# Patient Record
Sex: MALE | Race: WHITE | NOT HISPANIC OR LATINO | Employment: OTHER | URBAN - METROPOLITAN AREA
[De-identification: names, ages, dates, MRNs, and addresses within clinical notes are randomized per-mention and may not be internally consistent; named-entity substitution may affect disease eponyms.]

---

## 2017-11-27 ENCOUNTER — HOSPITAL ENCOUNTER (OUTPATIENT)
Dept: PREADMISSION TESTING | Facility: HOSPITAL | Age: 77
Discharge: HOME | End: 2017-11-27
Attending: INTERNAL MEDICINE | Admitting: INTERNAL MEDICINE

## 2017-11-27 LAB
ALBUMIN SERPL-MCNC: 3.9 G/DL (ref 3.5–4.8)
ALBUMIN/GLOB SERPL: 1.4 {RATIO} (ref 1–1.7)
ALP SERPL-CCNC: 43 IU/L (ref 32–91)
ALT SERPL-CCNC: 20 IU/L (ref 17–63)
ANION GAP SERPL CALC-SCNC: 9.2 MMOL/L (ref 10–20)
AST SERPL-CCNC: 18 IU/L (ref 15–41)
BASOPHILS # BLD AUTO: 0 10*3/UL (ref 0–0.2)
BASOPHILS NFR BLD AUTO: 1 % (ref 0–2)
BILIRUB SERPL-MCNC: 0.8 MG/DL (ref 0.3–1.2)
BUN SERPL-MCNC: 16 MG/DL (ref 8–20)
BUN/CREAT SERPL: 14.5 (ref 6.2–20.3)
CALCIUM SERPL-MCNC: 9.5 MG/DL (ref 8.9–10.3)
CHLORIDE SERPL-SCNC: 105 MMOL/L (ref 101–111)
CONV CO2: 26 MMOL/L (ref 22–32)
CONV TOTAL PROTEIN: 6.7 G/DL (ref 6.1–7.9)
CREAT UR-MCNC: 1.1 MG/DL (ref 0.7–1.2)
DIFFERENTIAL METHOD BLD: (no result)
EOSINOPHIL # BLD AUTO: 0.2 10*3/UL (ref 0–0.3)
EOSINOPHIL # BLD AUTO: 3 % (ref 0–3)
ERYTHROCYTE [DISTWIDTH] IN BLOOD BY AUTOMATED COUNT: 14.3 % (ref 11.5–14.5)
GLOBULIN UR ELPH-MCNC: 2.8 G/DL (ref 2.5–3.8)
GLUCOSE SERPL-MCNC: 102 MG/DL (ref 65–99)
HCT VFR BLD AUTO: 46.4 % (ref 40–54)
HGB BLD-MCNC: 15.4 G/DL (ref 14–18)
LYMPHOCYTES # BLD AUTO: 1.8 10*3/UL (ref 0.8–4.8)
LYMPHOCYTES NFR BLD AUTO: 26 % (ref 18–42)
MAGNESIUM SERPL-MCNC: 2.1 MG/DL (ref 1.8–2.5)
MCH RBC QN AUTO: 30.5 PG (ref 26–32)
MCHC RBC AUTO-ENTMCNC: 33.1 G/DL (ref 32–36)
MCV RBC AUTO: 91.9 FL (ref 80–94)
MONOCYTES # BLD AUTO: 0.6 10*3/UL (ref 0.1–1.3)
MONOCYTES NFR BLD AUTO: 9 % (ref 2–11)
NEUTROPHILS # BLD AUTO: 4.3 10*3/UL (ref 2.3–8.6)
NEUTROPHILS NFR BLD AUTO: 61 % (ref 50–75)
NRBC BLD AUTO-RTO: 0 /100{WBCS}
NRBC/RBC NFR BLD MANUAL: 0 10*3/UL
PLATELET # BLD AUTO: 129 10*3/UL (ref 150–450)
PMV BLD AUTO: 9.7 FL (ref 7.4–10.4)
POTASSIUM SERPL-SCNC: 4.2 MMOL/L (ref 3.6–5.1)
RBC # BLD AUTO: 5.05 10*6/UL (ref 4.6–6)
SODIUM SERPL-SCNC: 136 MMOL/L (ref 136–144)
TSH SERPL-ACNC: 2.39 UIU/ML (ref 0.34–5.6)
WBC # BLD AUTO: 7 10*3/UL (ref 4.5–11.5)

## 2019-06-28 ENCOUNTER — OFFICE VISIT (OUTPATIENT)
Dept: CARDIOLOGY | Facility: CLINIC | Age: 79
End: 2019-06-28

## 2019-06-28 VITALS
BODY MASS INDEX: 32.97 KG/M2 | WEIGHT: 236.4 LBS | RESPIRATION RATE: 18 BRPM | DIASTOLIC BLOOD PRESSURE: 78 MMHG | SYSTOLIC BLOOD PRESSURE: 154 MMHG | HEART RATE: 75 BPM | OXYGEN SATURATION: 96 %

## 2019-06-28 DIAGNOSIS — I10 ESSENTIAL HYPERTENSION: ICD-10-CM

## 2019-06-28 DIAGNOSIS — I48.0 PAROXYSMAL ATRIAL FIBRILLATION (HCC): Primary | ICD-10-CM

## 2019-06-28 DIAGNOSIS — E78.2 MIXED HYPERLIPIDEMIA: ICD-10-CM

## 2019-06-28 PROBLEM — E78.5 HYPERLIPIDEMIA: Status: ACTIVE | Noted: 2019-06-28

## 2019-06-28 PROCEDURE — 99213 OFFICE O/P EST LOW 20 MIN: CPT | Performed by: INTERNAL MEDICINE

## 2019-06-28 PROCEDURE — 93000 ELECTROCARDIOGRAM COMPLETE: CPT | Performed by: INTERNAL MEDICINE

## 2019-06-28 RX ORDER — LISINOPRIL 20 MG/1
20 TABLET ORAL DAILY
Status: ON HOLD | COMMUNITY
End: 2022-10-04

## 2019-06-28 RX ORDER — AMLODIPINE BESYLATE 2.5 MG/1
2.5 TABLET ORAL DAILY
COMMUNITY
End: 2020-11-23 | Stop reason: SDUPTHER

## 2019-06-28 RX ORDER — PRAVASTATIN SODIUM 20 MG
20 TABLET ORAL DAILY
Status: ON HOLD | COMMUNITY
End: 2022-10-04

## 2019-06-28 NOTE — PROGRESS NOTES
CC--Atrial fibrillation, hypertension    SUB--  78-YEAR-OLD MALE PATIENT WITH HISTORY OF ATRIAL FIBRILLATION AND 2 VESSEL CORONARY DISEASE STATUS POST BYPASS SURGERY  HAD RECURRENT ATRIAL ARRHYTHMIAS IN THE FORM OF ATRIAL FIBRILLATION BACK IN  AND STARTED BACK ON AMIODARONE AND UNDERWENT   CARDIOVERSION BACK IN OCTOBER AND HAD RECURRENT ATRIAL ARRHYTHMIAS IN THE FORM OF ATRIAL FLUTTER PRESENTED TO DR. STERLING OFFICE AND WAS SENT HERE FOR FURTHER EVALUATION.  PATIENT UNDERWENT RADIOFREQUENCY ABLATION OF ATRIAL FLUTTER AND  was DOING   REMARKABLY WELL,Patient developed recurrent symptomatic atrial fibrillation and underwent AF ablation  few months ago and comes in for follow-up -- he feels much better and remains in sinus rhythm and  back to functional class 1 without any chest pain or   dyspnea. chronic medical problems include paroxysmal atrial fibrillation atrial flutter, hypertension, coronary artery disease.          ASSESSMENT PLAN    SUSTAINED AN PERSISTENT AND SYMPTOMATIC ATRIAL FLUTTER-- no recurrence   recurrent symptomatic atrial fibrillation -- severe left and right atrial enlargement  noted-- post complex ablation to sinus rhythm with early recurrence of atrial fibrillation with  resolution currently in sinus   prior left atrial appendage ligation with bypass surgery  HYPERTENSION  CORONARY ARTERY DISEASE   positive for PFO    RECOMMENDATIONS      clinically doing well and medications were reviewed with the patient and follow-up in 6 months  Patient can stop Xarelto and take aspirin patient was educated        Vital Signs:    Pulse rate: 79 / minute  Pulse rhythm:   regular  Resp:       12 per minute  BP Sittin//82      Current Allergies (reviewed today):  No known allergies      Past Medical History:     Reviewed history from 2017 and no changes required:        Hyperlipidemia        Hypertension        Obesity        Arthritis        Peripheral vascular disease - < 50% stenoses of  the carotids        Atrial fibrillation-Radioablation 12/2016        Prostate biopsy - 2012 - benign        GERD        Coronary artery disease    Past Surgical History:     Reviewed history from 01/16/2017 and no changes required:        Nasal reconstruction        Prostate biopsy(2010)        Parathyroid adenma removed.         Cardioversion - 1/2012; 10/5/2016        CABG - 07/2014        A-flutter ablation 12/2016 [facility]    Family History Summary:      Reviewed history Last on 08/28/2018 and no changes required:12/14/2018  Father - Has Family History of Lung/Respiratory Disease - Entered On: 5/24/2016  Mother - Has Family History of Lung Cancer - Entered On: 2/2/2016      Social History:     Reviewed history from 08/28/2018 and no changes required:        Patient has never smoked.        Passive Smoke: N        Alcohol Use: Y        Drug Use: Y        HIV/High Risk: N            Review of Systems   General: No fatigue or tiredness, No change in weight   Eyes: No redness  Cardiovascular: No chest pain, no palpitations  Respiratory: No shortness of breath  Gastrointestinal: No nausea or vomiting, bleeding  Genitourinary: no hematuria or dysuria  Musculoskeletal: No arthralgia or myalgia  Skin: No rash  Neurologic: No numbness, tingling, syncope  Hematologic/Lymphatic: No abnormal bleeding      Physical Exam    General:      well developed, well nourished, in no acute distress.    Head:      normocephalic and atraumatic.    Eyes:      PERRL/EOM intact, conjunctiva and sclera clear with out nystagmus.    Neck:      no masses, thyromegaly, or abnormal cervical nodes.    Lungs:      clear bilaterally to auscultation.    Heart:      non-displaced PMI, chest non-tender; regular rate and rhythm, S1, S2 without murmurs, rubs, or gallops  Skin:      intact without lesions or rashes.    Psych:      alert and cooperative; normal mood and affect; normal attention span and concentration.        EKG shows sinus rhythm and  nonspecific ST-T wave changes

## 2019-09-10 ENCOUNTER — OFFICE VISIT (OUTPATIENT)
Dept: FAMILY MEDICINE CLINIC | Facility: CLINIC | Age: 79
End: 2019-09-10

## 2019-09-10 VITALS
RESPIRATION RATE: 14 BRPM | HEART RATE: 77 BPM | TEMPERATURE: 98.2 F | OXYGEN SATURATION: 96 % | WEIGHT: 230.6 LBS | BODY MASS INDEX: 32.28 KG/M2 | DIASTOLIC BLOOD PRESSURE: 83 MMHG | SYSTOLIC BLOOD PRESSURE: 148 MMHG | HEIGHT: 71 IN

## 2019-09-10 DIAGNOSIS — I25.10 CORONARY ARTERY DISEASE INVOLVING NATIVE CORONARY ARTERY OF NATIVE HEART WITHOUT ANGINA PECTORIS: ICD-10-CM

## 2019-09-10 DIAGNOSIS — J30.9 ALLERGIC RHINITIS, UNSPECIFIED SEASONALITY, UNSPECIFIED TRIGGER: ICD-10-CM

## 2019-09-10 DIAGNOSIS — I10 ESSENTIAL HYPERTENSION: Primary | ICD-10-CM

## 2019-09-10 DIAGNOSIS — E78.5 HYPERLIPIDEMIA, UNSPECIFIED HYPERLIPIDEMIA TYPE: ICD-10-CM

## 2019-09-10 DIAGNOSIS — M19.90 ARTHRITIS: ICD-10-CM

## 2019-09-10 DIAGNOSIS — I48.0 PAROXYSMAL ATRIAL FIBRILLATION (HCC): ICD-10-CM

## 2019-09-10 PROBLEM — Z13.9 ENCOUNTER FOR SCREENING: Status: RESOLVED | Noted: 2018-02-28 | Resolved: 2019-09-10

## 2019-09-10 PROBLEM — Z13.9 ENCOUNTER FOR SCREENING: Status: ACTIVE | Noted: 2018-02-28

## 2019-09-10 PROCEDURE — 99214 OFFICE O/P EST MOD 30 MIN: CPT | Performed by: FAMILY MEDICINE

## 2019-09-10 RX ORDER — MELOXICAM 7.5 MG/1
7.5 TABLET ORAL DAILY
Qty: 90 TABLET | Refills: 0 | Status: SHIPPED | OUTPATIENT
Start: 2019-09-10 | End: 2020-06-18

## 2019-09-10 RX ORDER — DOCUSATE SODIUM 100 MG/1
100 CAPSULE, LIQUID FILLED ORAL 2 TIMES DAILY PRN
Qty: 30 CAPSULE | Refills: 0 | Status: SHIPPED | OUTPATIENT
Start: 2019-09-10 | End: 2020-11-23

## 2019-09-10 RX ORDER — MELOXICAM 7.5 MG/1
1 TABLET ORAL DAILY
COMMUNITY
Start: 2016-08-02 | End: 2019-09-10 | Stop reason: SDUPTHER

## 2019-09-10 RX ORDER — OMEPRAZOLE 20 MG/1
1 CAPSULE, DELAYED RELEASE ORAL DAILY
COMMUNITY
Start: 2015-06-05 | End: 2019-11-18

## 2019-09-10 RX ORDER — ASPIRIN 81 MG/1
81 TABLET ORAL DAILY
Status: ON HOLD | COMMUNITY
End: 2022-10-04

## 2019-09-10 NOTE — PROGRESS NOTES
"Subjective   Tin Byrne is a 78 y.o. male.     Chief Complaint   Patient presents with   • Coronary Artery Disease     6 MTH F/U   • Hyperlipidemia   • Hypertension       HPI  Chief complaint: Hypertension hyperlipidemia fibrillation coronary disease GERD    Patient is a 78-year-old white male comes in for follow-up and maintenance of his current problems which include    1.  Hypertension-stable-patient on lisinopril 20 mg daily and Norvasc 2.5 mg daily.  He denies any lightheaded dizziness or chest pain.      2. hyperlipidemia-stable-patient on Pravachol 20 mg daily.  No myalgias arthralgias.      3.  Coronary artery disease-stable-patient on aspirin.  He denies chest pain shortness with orthopnea or PND.      4. Atrial fibrillation-stable-patient had 2 ablations.  Patient is in normal sinus rhythm.  She is on amiodarone 200 mg daily. The patient states he was told by his cardiologist he no longer needs to take take Xarelto.  On aspirin 81 mg daily.  He denies episodes of rapid or slow heart rhythm    5.  Arthritis-stable-patient on Mobic on as-needed basis but he states he takes this irregularly.    6.  Gastroesophageal reflux disease-stable-patient is on Prilosec 20 g daily.  No dysphagia or heartburn      The following portions of the patient's history were reviewed and updated as appropriate: allergies, current medications, past family history, past medical history, past social history, past surgical history and problem list.    Review of Systems    Objective     /83 (BP Location: Right arm, Patient Position: Sitting, Cuff Size: Adult)   Pulse 77   Temp 98.2 °F (36.8 °C) (Oral)   Resp 14   Ht 180.3 cm (71\")   Wt 105 kg (230 lb 9.6 oz)   SpO2 96%   BMI 32.16 kg/m²     Physical Exam   Constitutional: He is oriented to person, place, and time. He appears well-developed and well-nourished.   HENT:   Head: Normocephalic and atraumatic.   Eyes: Conjunctivae and EOM are normal. Pupils are equal, " round, and reactive to light.   Neck: Normal range of motion. Neck supple.   Cardiovascular: Normal rate, regular rhythm, normal heart sounds and intact distal pulses.   Pulmonary/Chest: Effort normal and breath sounds normal.   Abdominal: Soft. Bowel sounds are normal.   Musculoskeletal: Normal range of motion.   Neurological: He is alert and oriented to person, place, and time.   Skin: Skin is warm and dry.   Psychiatric: He has a normal mood and affect. His behavior is normal.   Nursing note and vitals reviewed.        Assessment/Plan   Tin was seen today for coronary artery disease, hyperlipidemia and hypertension.    Diagnoses and all orders for this visit:    Essential hypertension    Hyperlipidemia, unspecified hyperlipidemia type    Paroxysmal atrial fibrillation (CMS/HCC)    Coronary artery disease involving native coronary artery of native heart without angina pectoris    Allergic rhinitis, unspecified seasonality, unspecified trigger    Arthritis      Patient Instructions   Continue your current medications and treatment.    Follow up in the office in 6 months.    Laboratory testing at that time.      Jose Angel Ramirez Jr., MD    09/10/19

## 2019-10-29 ENCOUNTER — TELEPHONE (OUTPATIENT)
Dept: FAMILY MEDICINE CLINIC | Facility: CLINIC | Age: 79
End: 2019-10-29

## 2019-10-29 RX ORDER — AMOXICILLIN 875 MG/1
875 TABLET, COATED ORAL 2 TIMES DAILY
Qty: 20 TABLET | Refills: 0 | Status: SHIPPED | OUTPATIENT
Start: 2019-10-29 | End: 2019-11-18

## 2019-10-29 NOTE — TELEPHONE ENCOUNTER
Patient called stating he thought you were going to call in amoxicillin to [facility] in [facility].

## 2019-11-18 ENCOUNTER — OFFICE VISIT (OUTPATIENT)
Dept: CARDIOLOGY | Facility: CLINIC | Age: 79
End: 2019-11-18

## 2019-11-18 VITALS
SYSTOLIC BLOOD PRESSURE: 169 MMHG | BODY MASS INDEX: 32.5 KG/M2 | HEART RATE: 76 BPM | WEIGHT: 233 LBS | DIASTOLIC BLOOD PRESSURE: 79 MMHG

## 2019-11-18 DIAGNOSIS — I48.0 PAROXYSMAL ATRIAL FIBRILLATION (HCC): Primary | ICD-10-CM

## 2019-11-18 DIAGNOSIS — E78.2 MIXED HYPERLIPIDEMIA: ICD-10-CM

## 2019-11-18 DIAGNOSIS — I10 ESSENTIAL HYPERTENSION: ICD-10-CM

## 2019-11-18 PROCEDURE — 99213 OFFICE O/P EST LOW 20 MIN: CPT | Performed by: INTERNAL MEDICINE

## 2019-11-18 PROCEDURE — 93000 ELECTROCARDIOGRAM COMPLETE: CPT | Performed by: INTERNAL MEDICINE

## 2019-11-18 NOTE — PROGRESS NOTES
EKG shows sinus rhythm with first-degree AV block heart rate of 76 NH interval 200 QT of 391 QRS of 99 nonspecific ST changes and indication for EKG includes intermittent atrial arrhythmias and no significant EKG changes compared to prior ECG    CC--Atrial fibrillation, hypertension    SUB--79-year-old male patient with history of atrial fibrillation and two-vessel coronary artery disease status post bypass surgery with recurrent atrial arrhythmias in the form of atrial fibrillation and atrial flutter and underwent atrial flutter ablation and post atrial flutter ablation several months later developed atrial fibrillation underwent AF ablation and comes in for follow-up  He complains of brief palpitations with atrial arrhythmia lasting transiently  Is close to functional class I and denies any syncope or chest pain  chronic medical problems include paroxysmal atrial fibrillation atrial flutter, hypertension, coronary artery disease.          ASSESSMENT PLAN  Sustained permanent atrial flutter prior ablation without recurrence  Recurrent symptomatic atrial fibrillation with prior complex AF arrhythmia ablation with prior left atrial appendage ligation with bypass surgery with transient symptoms doing fairly well in functional class I  Hypertension--home monitoring educated  Presence of PFO  Coronary disease  Medications reviewed and follow-up after few months        Vital Signs: Blood pressure 169/79 pulse rate 76 patient afebrile respiration 12 times a minute    Current Allergies (reviewed today):  No known allergies      Past Medical History:     Reviewed history from 01/24/2017 and no changes required:        Hyperlipidemia        Hypertension        Obesity        Arthritis        Peripheral vascular disease - < 50% stenoses of the carotids        Atrial fibrillation-Radioablation 12/2016        Prostate biopsy - 2012 - benign        GERD        Coronary artery disease    Past Surgical History:     Reviewed  history from 01/16/2017 and no changes required:        Nasal reconstruction        Prostate biopsy(2010)        Parathyroid adenma removed.         Cardioversion - 1/2012; 10/5/2016        CABG - 07/2014        A-flutter ablation 12/2016 [facility]    Family History Summary:      Reviewed history Last on 08/28/2018 and no changes required:12/14/2018  Father - Has Family History of Lung/Respiratory Disease - Entered On: 5/24/2016  Mother - Has Family History of Lung Cancer - Entered On: 2/2/2016                  Review of Systems   General: No fatigue or tiredness, No change in weight   Eyes: No redness  Cardiovascular: No chest pain, no palpitations  Respiratory: No shortness of breath  Gastrointestinal: No nausea or vomiting, bleeding  Genitourinary: no hematuria or dysuria  Musculoskeletal: No arthralgia or myalgia  Skin: No rash  Neurologic: No numbness, tingling, syncope  Hematologic/Lymphatic: No abnormal bleeding      Physical Exam    General:      well developed, well nourished, in no acute distress.    Head:      normocephalic and atraumatic.    Eyes:      PERRL/EOM intact, conjunctiva and sclera clear with out nystagmus.    Neck:      no masses, thyromegaly, or abnormal cervical nodes.    Lungs:      clear bilaterally to auscultation.    Heart:      non-displaced PMI, chest non-tender; regular rate and rhythm, S1, S2 without murmurs, rubs, or gallops  Skin:      intact without lesions or rashes.    Psych:      alert and cooperative; normal mood and affect; normal attention span and concentration.

## 2020-02-21 ENCOUNTER — OFFICE VISIT (OUTPATIENT)
Dept: CARDIOLOGY | Facility: CLINIC | Age: 80
End: 2020-02-21

## 2020-02-21 VITALS
HEART RATE: 73 BPM | BODY MASS INDEX: 32.36 KG/M2 | SYSTOLIC BLOOD PRESSURE: 161 MMHG | OXYGEN SATURATION: 99 % | WEIGHT: 232 LBS | DIASTOLIC BLOOD PRESSURE: 93 MMHG

## 2020-02-21 DIAGNOSIS — I10 ESSENTIAL HYPERTENSION: ICD-10-CM

## 2020-02-21 DIAGNOSIS — I48.0 PAROXYSMAL ATRIAL FIBRILLATION (HCC): Primary | ICD-10-CM

## 2020-02-21 DIAGNOSIS — E78.2 MIXED HYPERLIPIDEMIA: ICD-10-CM

## 2020-02-21 PROCEDURE — 93000 ELECTROCARDIOGRAM COMPLETE: CPT | Performed by: INTERNAL MEDICINE

## 2020-02-21 PROCEDURE — 99213 OFFICE O/P EST LOW 20 MIN: CPT | Performed by: INTERNAL MEDICINE

## 2020-02-21 NOTE — PROGRESS NOTES
CC--Atrial fibrillation, hypertension    SUB--79-year-old male patient with history of atrial fibrillation and two-vessel coronary artery disease status post bypass surgery with recurrent atrial arrhythmias in the form of atrial fibrillation and atrial flutter and underwent atrial flutter ablation and post atrial flutter ablation several months later developed atrial fibrillation underwent AF ablation and comes in for follow-up  He is doing remarkably well without symptoms of palpitations or atrial arrhythmias in the last few months and remains in functional class and denies any syncope or chest pain  chronic medical problems include paroxysmal atrial fibrillation atrial flutter, hypertension, coronary artery disease.          ASSESSMENT PLAN  Sustained permanent atrial flutter prior ablation without recurrence  Recurrent symptomatic atrial fibrillation with prior complex AF arrhythmia ablation with prior left atrial appendage ligation with bypass surgery  doing fairly well in functional class I  Hypertension--well controlled at home monitoring   Presence of PFO  Coronary disease  Medications reviewed and follow-up after few months        Vital Signs: Blood pressure 161/93 pulse rate 73 patient afebrile respiration 12 times a minute  EKG shows sinus rhythm with left atrial enlargement and low voltage complexes heart rate of 73 NH interval 162 QRS of 101 QTC of 427 and no significant changes compared to prior EKG and EKG indication includes atrial arrhythmias with prior ablation    Current Allergies (reviewed today):  No known allergies      Past Medical History:     Reviewed history from 01/24/2017 and no changes required:        Hyperlipidemia        Hypertension        Obesity        Arthritis        Peripheral vascular disease - < 50% stenoses of the carotids        Atrial fibrillation-Radioablation 12/2016        Prostate biopsy - 2012 - benign        GERD        Coronary artery disease    Past Surgical  History:     Reviewed history from 01/16/2017 and no changes required:        Nasal reconstruction        Prostate biopsy(2010)        Parathyroid adenma removed.         Cardioversion - 1/2012; 10/5/2016        CABG - 07/2014        A-flutter ablation 12/2016 [facility]            Review of Systems   General: No fatigue or tiredness, No change in weight   Eyes: No redness  Cardiovascular: No chest pain, no palpitations  Respiratory: No shortness of breath  Gastrointestinal: No nausea or vomiting, bleeding  Genitourinary: no hematuria or dysuria  Musculoskeletal: No arthralgia or myalgia  Skin: No rash  Neurologic: No numbness, tingling, syncope  Hematologic/Lymphatic: No abnormal bleeding      Physical Exam    General:      well developed, well nourished, in no acute distress.    Head:      normocephalic and atraumatic.    Eyes:      PERRL/EOM intact, conjunctiva and sclera clear with out nystagmus.    Neck:      no masses, thyromegaly, or abnormal cervical nodes.    Lungs:      clear bilaterally to auscultation.    Heart:      non-displaced PMI, chest non-tender; regular rate and rhythm, S1, S2 without murmurs, rubs, or gallops  Skin:      intact without lesions or rashes.    Psych:      alert and cooperative; normal mood and affect; normal attention span and concentration.      Electronically signed by Mark Nolan MD, 02/21/20, 9:03 PM.

## 2020-06-18 ENCOUNTER — OFFICE VISIT (OUTPATIENT)
Dept: FAMILY MEDICINE CLINIC | Facility: CLINIC | Age: 80
End: 2020-06-18

## 2020-06-18 VITALS
RESPIRATION RATE: 20 BRPM | SYSTOLIC BLOOD PRESSURE: 155 MMHG | HEIGHT: 71 IN | WEIGHT: 233.4 LBS | HEART RATE: 55 BPM | BODY MASS INDEX: 32.68 KG/M2 | OXYGEN SATURATION: 99 % | DIASTOLIC BLOOD PRESSURE: 85 MMHG

## 2020-06-18 DIAGNOSIS — I48.0 PAROXYSMAL ATRIAL FIBRILLATION (HCC): ICD-10-CM

## 2020-06-18 DIAGNOSIS — E78.5 HYPERLIPIDEMIA, UNSPECIFIED HYPERLIPIDEMIA TYPE: ICD-10-CM

## 2020-06-18 DIAGNOSIS — Z00.00 ENCOUNTER FOR MEDICARE ANNUAL WELLNESS EXAM: ICD-10-CM

## 2020-06-18 DIAGNOSIS — I10 ESSENTIAL HYPERTENSION: Primary | ICD-10-CM

## 2020-06-18 DIAGNOSIS — I25.10 CORONARY ARTERY DISEASE INVOLVING NATIVE CORONARY ARTERY OF NATIVE HEART WITHOUT ANGINA PECTORIS: ICD-10-CM

## 2020-06-18 DIAGNOSIS — M19.90 ARTHRITIS: ICD-10-CM

## 2020-06-18 PROCEDURE — 99214 OFFICE O/P EST MOD 30 MIN: CPT | Performed by: FAMILY MEDICINE

## 2020-06-18 PROCEDURE — G0438 PPPS, INITIAL VISIT: HCPCS | Performed by: FAMILY MEDICINE

## 2020-06-18 RX ORDER — MELOXICAM 15 MG/1
15 TABLET ORAL DAILY
Qty: 90 TABLET | Refills: 3 | Status: SHIPPED | OUTPATIENT
Start: 2020-06-18 | End: 2022-01-19 | Stop reason: SDUPTHER

## 2020-06-18 NOTE — PROGRESS NOTES
"Subjective   Tin Byrne is a 79 y.o. male.     Chief Complaint   Patient presents with   • Medicare Wellness-Initial Visit   • Hypertension   • Atrial Fibrillation   • Coronary Artery Disease       HPI  Chief complaint: Hypertension atrial fibrillation hyperlipidemia coronary artery disease    The patient is an 79-year-old white male who comes in for follow-up and maintenance of his current problems which include    1.  Hypertension-stable-patient on amlodipine 2.5 mg daily lisinopril 20 mg daily.  He denied headache lightheadedness dizziness or chest pain.    2.  Atrial fibrillation-stable-patient is on no rate limiting drugs.  He denied episodes of rapid or slow heart rhythm.  He is had the left atrial appendage closed surgically.  He is on aspirin 81 mg daily.    3.  Hyperlipidemia-stable-patient on Pravachol 20 mg daily.  Denies myalgias and arthralgias.  No nausea or anorexia.    4.  Coronary artery disease-stable-patient on aspirin 81 mg daily.  He denies chest pain shortness of breath orthopnea or PND.    5.  Osteoarthritis-stable-patient is currently on meloxicam.        The following portions of the patient's history were reviewed and updated as appropriate: allergies, current medications, past family history, past medical history, past social history, past surgical history and problem list.    Review of Systems    Objective     /85 (BP Location: Right arm, Patient Position: Sitting, Cuff Size: Adult)   Pulse 55   Resp 20   Ht 180.3 cm (71\")   Wt 106 kg (233 lb 6.4 oz)   SpO2 99%   BMI 32.55 kg/m²     Physical Exam   Constitutional: He is oriented to person, place, and time. He appears well-developed and well-nourished.   HENT:   Head: Normocephalic and atraumatic.   Eyes: Pupils are equal, round, and reactive to light. Conjunctivae and EOM are normal.   Neck: Normal range of motion. Neck supple.   Cardiovascular: Normal rate, normal heart sounds and intact distal pulses. An " irregularly irregular rhythm present.   Pulmonary/Chest: Effort normal and breath sounds normal.   Abdominal: Soft. Bowel sounds are normal.   Musculoskeletal: Normal range of motion.   Neurological: He is alert and oriented to person, place, and time.   Skin: Skin is warm and dry.   Psychiatric: He has a normal mood and affect. His behavior is normal.   Nursing note and vitals reviewed.        Assessment/Plan   Tin was seen today for medicare wellness-initial visit, hypertension, atrial fibrillation and coronary artery disease.    Diagnoses and all orders for this visit:    Essential hypertension    Encounter for Medicare annual wellness exam    Hyperlipidemia, unspecified hyperlipidemia type    Paroxysmal atrial fibrillation (CMS/HCC)    Coronary artery disease involving native coronary artery of native heart without angina pectoris    Arthritis    Other orders  -     meloxicam (MOBIC) 15 MG tablet; Take 1 tablet by mouth Daily.      Patient Instructions   Continue your current medications and treatment.    Follow up in the office in 6 months.    Laboratory testing at that time.      Jose Angel Ramirez Jr., MD    06/18/20

## 2020-09-21 ENCOUNTER — OFFICE VISIT (OUTPATIENT)
Dept: FAMILY MEDICINE CLINIC | Facility: CLINIC | Age: 80
End: 2020-09-21

## 2020-09-21 ENCOUNTER — LAB (OUTPATIENT)
Dept: LAB | Facility: HOSPITAL | Age: 80
End: 2020-09-21

## 2020-09-21 VITALS
HEIGHT: 70 IN | DIASTOLIC BLOOD PRESSURE: 76 MMHG | RESPIRATION RATE: 20 BRPM | SYSTOLIC BLOOD PRESSURE: 129 MMHG | OXYGEN SATURATION: 99 % | BODY MASS INDEX: 31.61 KG/M2 | WEIGHT: 220.8 LBS | TEMPERATURE: 97.1 F | HEART RATE: 95 BPM

## 2020-09-21 DIAGNOSIS — R63.0 ANOREXIA: Primary | ICD-10-CM

## 2020-09-21 DIAGNOSIS — R63.4 WEIGHT LOSS: ICD-10-CM

## 2020-09-21 DIAGNOSIS — R63.0 ANOREXIA: ICD-10-CM

## 2020-09-21 LAB
ALBUMIN SERPL-MCNC: 3.6 G/DL (ref 3.5–5.2)
ALBUMIN/GLOB SERPL: 1.2 G/DL
ALP SERPL-CCNC: 58 U/L (ref 39–117)
ALT SERPL W P-5'-P-CCNC: 26 U/L (ref 1–41)
AMYLASE SERPL-CCNC: 52 U/L (ref 28–100)
ANION GAP SERPL CALCULATED.3IONS-SCNC: 11.3 MMOL/L (ref 5–15)
AST SERPL-CCNC: 20 U/L (ref 1–40)
BASOPHILS # BLD AUTO: 0.03 10*3/MM3 (ref 0–0.2)
BASOPHILS NFR BLD AUTO: 0.4 % (ref 0–1.5)
BILIRUB SERPL-MCNC: 0.6 MG/DL (ref 0–1.2)
BILIRUB UR QL STRIP: NEGATIVE
BUN SERPL-MCNC: 11 MG/DL (ref 8–23)
BUN/CREAT SERPL: 10.9 (ref 7–25)
CALCIUM SPEC-SCNC: 9.3 MG/DL (ref 8.6–10.5)
CHLORIDE SERPL-SCNC: 102 MMOL/L (ref 98–107)
CLARITY UR: CLEAR
CO2 SERPL-SCNC: 24.7 MMOL/L (ref 22–29)
COLOR UR: ABNORMAL
CREAT SERPL-MCNC: 1.01 MG/DL (ref 0.76–1.27)
DEPRECATED RDW RBC AUTO: 42.8 FL (ref 37–54)
EOSINOPHIL # BLD AUTO: 0.1 10*3/MM3 (ref 0–0.4)
EOSINOPHIL NFR BLD AUTO: 1.3 % (ref 0.3–6.2)
ERYTHROCYTE [DISTWIDTH] IN BLOOD BY AUTOMATED COUNT: 13 % (ref 12.3–15.4)
GFR SERPL CREATININE-BSD FRML MDRD: 71 ML/MIN/1.73
GLOBULIN UR ELPH-MCNC: 3.1 GM/DL
GLUCOSE SERPL-MCNC: 108 MG/DL (ref 65–99)
GLUCOSE UR STRIP-MCNC: NEGATIVE MG/DL
HCT VFR BLD AUTO: 46.2 % (ref 37.5–51)
HGB BLD-MCNC: 15.4 G/DL (ref 13–17.7)
HGB UR QL STRIP.AUTO: NEGATIVE
IMM GRANULOCYTES # BLD AUTO: 0.03 10*3/MM3 (ref 0–0.05)
IMM GRANULOCYTES NFR BLD AUTO: 0.4 % (ref 0–0.5)
KETONES UR QL STRIP: ABNORMAL
LEUKOCYTE ESTERASE UR QL STRIP.AUTO: NEGATIVE
LIPASE SERPL-CCNC: 24 U/L (ref 13–60)
LYMPHOCYTES # BLD AUTO: 1.09 10*3/MM3 (ref 0.7–3.1)
LYMPHOCYTES NFR BLD AUTO: 14.5 % (ref 19.6–45.3)
MCH RBC QN AUTO: 29.8 PG (ref 26.6–33)
MCHC RBC AUTO-ENTMCNC: 33.3 G/DL (ref 31.5–35.7)
MCV RBC AUTO: 89.5 FL (ref 79–97)
MONOCYTES # BLD AUTO: 0.78 10*3/MM3 (ref 0.1–0.9)
MONOCYTES NFR BLD AUTO: 10.3 % (ref 5–12)
NEUTROPHILS NFR BLD AUTO: 5.51 10*3/MM3 (ref 1.7–7)
NEUTROPHILS NFR BLD AUTO: 73.1 % (ref 42.7–76)
NITRITE UR QL STRIP: NEGATIVE
NRBC BLD AUTO-RTO: 0 /100 WBC (ref 0–0.2)
PH UR STRIP.AUTO: 5.5 [PH] (ref 5–8)
PLATELET # BLD AUTO: 169 10*3/MM3 (ref 140–450)
PMV BLD AUTO: 12.4 FL (ref 6–12)
POTASSIUM SERPL-SCNC: 4.1 MMOL/L (ref 3.5–5.2)
PROT SERPL-MCNC: 6.7 G/DL (ref 6–8.5)
PROT UR QL STRIP: NEGATIVE
RBC # BLD AUTO: 5.16 10*6/MM3 (ref 4.14–5.8)
SODIUM SERPL-SCNC: 138 MMOL/L (ref 136–145)
SP GR UR STRIP: 1.02 (ref 1–1.03)
UROBILINOGEN UR QL STRIP: ABNORMAL
WBC # BLD AUTO: 7.54 10*3/MM3 (ref 3.4–10.8)

## 2020-09-21 PROCEDURE — 99213 OFFICE O/P EST LOW 20 MIN: CPT | Performed by: FAMILY MEDICINE

## 2020-09-21 PROCEDURE — 82150 ASSAY OF AMYLASE: CPT

## 2020-09-21 PROCEDURE — 85025 COMPLETE CBC W/AUTO DIFF WBC: CPT

## 2020-09-21 PROCEDURE — 83690 ASSAY OF LIPASE: CPT

## 2020-09-21 PROCEDURE — 36415 COLL VENOUS BLD VENIPUNCTURE: CPT

## 2020-09-21 PROCEDURE — 81003 URINALYSIS AUTO W/O SCOPE: CPT

## 2020-09-21 PROCEDURE — 80053 COMPREHEN METABOLIC PANEL: CPT

## 2020-09-21 NOTE — PROGRESS NOTES
"Subjective   Tin Byrne is a 79 y.o. male.     Chief Complaint   Patient presents with   • Fatigue   • Anorexia       HPI  Chief complaint: Fatigue and anorexia    The patient is an 79-year-old white male comes in for follow-up and maintenance of his current problems which include    1.  Fatigue/anorexia-new-patient states that for the past 2 to 4 weeks he has had increased tiredness and fatigue.  Patient also complains of lack of appetite.  He states that he feels hungry and when he starts to eat he is not able to eat as much as he normally does and his appetite rapidly goes away.  Does have some nausea.  Denied vomiting.  Denied diarrhea.  Denied blood in stool.  Denied abdominal pain or fever chills or night sweats.  He does complain of some dry cough.        The following portions of the patient's history were reviewed and updated as appropriate: allergies, current medications, past family history, past medical history, past social history, past surgical history and problem list.    Review of Systems    Objective     /76 (BP Location: Left arm, Patient Position: Sitting, Cuff Size: Large Adult)   Pulse 95   Temp 97.1 °F (36.2 °C) (Infrared)   Resp 20   Ht 177.8 cm (70\")   Wt 100 kg (220 lb 12.8 oz)   SpO2 99%   BMI 31.68 kg/m²     Physical Exam  Vitals signs and nursing note reviewed.   Constitutional:       Appearance: Normal appearance. He is well-developed and normal weight.   HENT:      Head: Normocephalic and atraumatic.      Nose: Nose normal.      Mouth/Throat:      Mouth: Mucous membranes are dry.      Pharynx: Oropharynx is clear.   Eyes:      Extraocular Movements: Extraocular movements intact.      Conjunctiva/sclera: Conjunctivae normal.      Pupils: Pupils are equal, round, and reactive to light.   Neck:      Musculoskeletal: Normal range of motion and neck supple.   Cardiovascular:      Rate and Rhythm: Normal rate. Rhythm regularly irregular.      Heart sounds: Normal heart " sounds.   Pulmonary:      Effort: Pulmonary effort is normal.      Breath sounds: Normal breath sounds.   Abdominal:      General: Abdomen is flat. Bowel sounds are normal.      Palpations: Abdomen is soft.   Musculoskeletal: Normal range of motion.   Skin:     General: Skin is warm and dry.   Neurological:      General: No focal deficit present.      Mental Status: He is alert and oriented to person, place, and time. Mental status is at baseline.   Psychiatric:         Behavior: Behavior normal.         Thought Content: Thought content normal.         Judgment: Judgment normal.     The patient was advised that the cause for his symptoms was not immediately obvious.  I recommended laboratory testing and CT scan of the abdomen pelvis and chest.    Assessment/Plan   Tin was seen today for fatigue and anorexia.    Diagnoses and all orders for this visit:    Anorexia  -     CT Abdomen Pelvis With Contrast; Future  -     CT Chest With Contrast; Future  -     CBC & Differential; Future  -     Comprehensive Metabolic Panel; Future  -     Urinalysis With / Culture If Indicated -; Future  -     Amylase; Future  -     Lipase; Future    Weight loss  -     CT Abdomen Pelvis With Contrast; Future  -     CT Chest With Contrast; Future  -     CBC & Differential; Future  -     Comprehensive Metabolic Panel; Future  -     Urinalysis With / Culture If Indicated -; Future  -     Amylase; Future  -     Lipase; Future      Patient Instructions   Continue your current medications and treatment.    Have the labs and the CT scans done and call for results.    Further care will depend on the results of the tests and how you progress.      Jose Angel Ramirez Jr., MD    09/21/20

## 2020-09-21 NOTE — PATIENT INSTRUCTIONS
Continue your current medications and treatment.    Have the labs and the CT scans done and call for results.    Further care will depend on the results of the tests and how you progress.

## 2020-09-25 ENCOUNTER — APPOINTMENT (OUTPATIENT)
Dept: CT IMAGING | Facility: HOSPITAL | Age: 80
End: 2020-09-25

## 2020-09-28 ENCOUNTER — TELEPHONE (OUTPATIENT)
Dept: FAMILY MEDICINE CLINIC | Facility: CLINIC | Age: 80
End: 2020-09-28

## 2020-09-28 NOTE — TELEPHONE ENCOUNTER
I spoke with the patient.   He tested positive for COVID which can explain his symptoms of anorexia and weight loss.  I recommended symptomatic therapy.  He is to quarantine.

## 2020-10-12 ENCOUNTER — TELEPHONE (OUTPATIENT)
Dept: FAMILY MEDICINE CLINIC | Facility: CLINIC | Age: 80
End: 2020-10-12

## 2020-11-23 ENCOUNTER — OFFICE VISIT (OUTPATIENT)
Dept: CARDIOLOGY | Facility: CLINIC | Age: 80
End: 2020-11-23

## 2020-11-23 VITALS
WEIGHT: 228 LBS | OXYGEN SATURATION: 98 % | BODY MASS INDEX: 32.71 KG/M2 | SYSTOLIC BLOOD PRESSURE: 180 MMHG | DIASTOLIC BLOOD PRESSURE: 94 MMHG | HEART RATE: 79 BPM

## 2020-11-23 DIAGNOSIS — E78.2 MIXED HYPERLIPIDEMIA: ICD-10-CM

## 2020-11-23 DIAGNOSIS — I25.10 CORONARY ARTERY DISEASE INVOLVING NATIVE CORONARY ARTERY OF NATIVE HEART WITHOUT ANGINA PECTORIS: ICD-10-CM

## 2020-11-23 DIAGNOSIS — I10 ESSENTIAL HYPERTENSION: ICD-10-CM

## 2020-11-23 DIAGNOSIS — I48.0 PAROXYSMAL ATRIAL FIBRILLATION (HCC): Primary | ICD-10-CM

## 2020-11-23 PROCEDURE — 93000 ELECTROCARDIOGRAM COMPLETE: CPT | Performed by: INTERNAL MEDICINE

## 2020-11-23 PROCEDURE — 99214 OFFICE O/P EST MOD 30 MIN: CPT | Performed by: INTERNAL MEDICINE

## 2020-11-23 RX ORDER — AMLODIPINE BESYLATE 5 MG/1
5 TABLET ORAL DAILY
Qty: 90 TABLET | Refills: 3 | Status: ON HOLD | OUTPATIENT
Start: 2020-11-23 | End: 2022-10-04

## 2020-11-23 NOTE — PROGRESS NOTES
CC--Atrial fibrillation, hypertension    SUB--80-year-old male patient with history of atrial fibrillation and two-vessel coronary artery disease status post bypass surgery with recurrent atrial arrhythmias in the form of atrial fibrillation and atrial flutter and underwent atrial flutter ablation and post atrial flutter ablation several months later developed atrial fibrillation underwent AF ablation and comes in for follow-up.He is doing remarkably well without symptoms of palpitations or atrial arrhythmias in the last few months and remains in functional class and denies any syncope or chest pain.  chronic medical problems include paroxysmal atrial fibrillation atrial flutter, hypertension, coronary artery disease.  She had Covid infection back in August and during that time he had atrial fibrillation and since then he has recovered very well and back to exercise regimen without recurrent symptoms and he has noticed periodically that his blood pressure has been elevated          ASSESSMENT PLAN  Sustained permanent atrial flutter prior ablation without recurrence  Recurrent symptomatic atrial fibrillation with prior complex AF arrhythmia ablation with prior left atrial appendage ligation with bypass surgery  doing fairly well in functional class I  Hypertension--increase amlodipine to 5 mg a day and continue home monitoring  Presence of PFO  Coronary disease  Medications reviewed and follow-up after few months  Prescription for amlodipine sent out      Vital Signs: Blood pressure 180/94 pulse rate 79 patient afebrile respiration 12 times a minute    Past Medical History:     Reviewed history from 01/24/2017 and no changes required:        Hyperlipidemia        Hypertension        Obesity        Arthritis        Peripheral vascular disease - < 50% stenoses of the carotids        Atrial fibrillation-Radioablation 12/2016        Prostate biopsy - 2012 - benign        GERD        Coronary artery disease    Past  Surgical History:     Reviewed history from 01/16/2017 and no changes required:        Nasal reconstruction        Prostate biopsy(2010)        Parathyroid adenma removed.         Cardioversion - 1/2012; 10/5/2016        CABG - 07/2014        A-flutter ablation 12/2016 [facility]            Review of Systems   General: No fatigue or tiredness, No change in weight   Eyes: No redness  Cardiovascular: No chest pain, no palpitations  Respiratory: No shortness of breath  Gastrointestinal: No nausea or vomiting, bleeding  Genitourinary: no hematuria or dysuria  Musculoskeletal: No arthralgia or myalgia  Skin: No rash  Neurologic: No numbness, tingling, syncope  Hematologic/Lymphatic: No abnormal bleeding      Physical Exam    General:      well developed, well nourished, in no acute distress.    Head:      normocephalic and atraumatic.    Eyes:      PERRL/EOM intact, conjunctiva and sclera clear with out nystagmus.    Neck:      no masses, thyromegaly, or abnormal cervical nodes.    Lungs:      clear bilaterally to auscultation.    Heart:      non-displaced PMI, chest non-tender; regular rate and rhythm, S1, S2 without murmurs, rubs, or gallops  Skin:      intact without lesions or rashes.    Psych:      alert and cooperative; normal mood and affect; normal attention span and concentration.          ECG 12 Lead    Date/Time: 11/23/2020 4:13 PM  Performed by: Mark Nolan MD  Authorized by: Mark Nolan MD   Comparison: compared with previous ECG   Similar to previous ECG  Rhythm: sinus rhythm  Rate: normal  Conduction: 1st degree AV block  Q waves: III    Other findings: non-specific ST-T wave changes          Electronically signed by Mark Nolan MD, 11/23/20, 4:07 PM EST.

## 2020-11-24 ENCOUNTER — TELEPHONE (OUTPATIENT)
Dept: CARDIOLOGY | Facility: CLINIC | Age: 80
End: 2020-11-24

## 2020-11-24 NOTE — TELEPHONE ENCOUNTER
Pt called stating refill on amlodipine did not go through to the pharmacy.  I called pharmacy and authorized refill let them know it was sent on 11/23/20 by Dr Nolan.  Pt will  refill later today.

## 2021-01-07 ENCOUNTER — OFFICE VISIT (OUTPATIENT)
Dept: FAMILY MEDICINE CLINIC | Facility: CLINIC | Age: 81
End: 2021-01-07

## 2021-01-07 VITALS
RESPIRATION RATE: 16 BRPM | WEIGHT: 233 LBS | DIASTOLIC BLOOD PRESSURE: 90 MMHG | SYSTOLIC BLOOD PRESSURE: 159 MMHG | BODY MASS INDEX: 33.36 KG/M2 | OXYGEN SATURATION: 100 % | HEART RATE: 74 BPM | TEMPERATURE: 96.6 F | HEIGHT: 70 IN

## 2021-01-07 DIAGNOSIS — E78.5 HYPERLIPIDEMIA, UNSPECIFIED HYPERLIPIDEMIA TYPE: Chronic | ICD-10-CM

## 2021-01-07 DIAGNOSIS — I48.0 PAROXYSMAL ATRIAL FIBRILLATION (HCC): Chronic | ICD-10-CM

## 2021-01-07 DIAGNOSIS — I10 ESSENTIAL HYPERTENSION: Primary | Chronic | ICD-10-CM

## 2021-01-07 DIAGNOSIS — M19.90 ARTHRITIS: Chronic | ICD-10-CM

## 2021-01-07 DIAGNOSIS — K21.9 GASTROESOPHAGEAL REFLUX DISEASE WITHOUT ESOPHAGITIS: Chronic | ICD-10-CM

## 2021-01-07 DIAGNOSIS — I25.10 CORONARY ARTERY DISEASE INVOLVING NATIVE CORONARY ARTERY OF NATIVE HEART WITHOUT ANGINA PECTORIS: Chronic | ICD-10-CM

## 2021-01-07 PROBLEM — R63.0 ANOREXIA: Status: RESOLVED | Noted: 2020-09-21 | Resolved: 2021-01-07

## 2021-01-07 PROCEDURE — 99214 OFFICE O/P EST MOD 30 MIN: CPT | Performed by: FAMILY MEDICINE

## 2021-01-07 NOTE — PROGRESS NOTES
"Subjective   Tin Byrne is a 80 y.o. male.     Chief Complaint   Patient presents with   • Hypertension     6 month followup   • Hyperlipidemia       HPI  Chief complaint: Hypertension hyperlipidemia atrial fibrillation coronary artery disease    Patient is an 80-year-old white male comes in for follow-up and maintenance of his current problems which include    1.  Coronary artery disease-stable-patient on aspirin 81 mg daily.  Denies chest pain shortness breath orthopnea or PND.    2.  Hypertension-stable-patient on amlodipine 5 mg daily lisinopril 20 mg daily.  He denied headache lightheadedness dizziness or chest pain.    3.  Hyperlipidemia-stable-patient on Pravachol 20 mg daily.  No myalgias no arthralgias.  No nausea or anorexia.    4.  Atrial fibrillation-stable-patient is on aspirin 81 mg daily.  Patient is not on any rate limiting drugs.  Patient's had radioablation.  He states that he is in sinus rhythm.    5.  Arthritis-stable-patient on Mobic 15-minute.  States his help with his joint pain.    6.  Gastroesophageal reflux disease-stable-patient on proton pump inhibitor over-the-counter.      The following portions of the patient's history were reviewed and updated as appropriate: allergies, current medications, past family history, past medical history, past social history, past surgical history and problem list.    Review of Systems    Objective     /90 (BP Location: Right arm, Patient Position: Sitting, Cuff Size: Large Adult)   Pulse 74   Temp 96.6 °F (35.9 °C) (Infrared)   Resp 16   Ht 177.8 cm (70\")   Wt 106 kg (233 lb)   SpO2 100%   BMI 33.43 kg/m²     Physical Exam  Vitals signs and nursing note reviewed.   Constitutional:       Appearance: He is well-developed and normal weight.   HENT:      Head: Normocephalic and atraumatic.   Cardiovascular:      Rate and Rhythm: Normal rate and regular rhythm.      Pulses: Normal pulses.      Heart sounds: Normal heart sounds. "   Pulmonary:      Effort: Pulmonary effort is normal.      Breath sounds: Normal breath sounds.   Abdominal:      General: Bowel sounds are normal.      Palpations: Abdomen is soft.   Musculoskeletal: Normal range of motion.   Skin:     General: Skin is warm and dry.   Neurological:      Mental Status: He is alert and oriented to person, place, and time.   Psychiatric:         Behavior: Behavior normal.         Thought Content: Thought content normal.         Judgment: Judgment normal.     The patient brought me [facility].  I reviewed his CBC and complete metabolic profile.  These were normal.    Lipase (09/21/2020 09:24)  Amylase (09/21/2020 09:24)  Comprehensive Metabolic Panel (09/21/2020 09:24)  CBC & Differential (09/21/2020 09:24)  Urinalysis With / Culture If Indicated - Urine, Clean Catch (09/21/2020 09:22)  ECG 12 Lead (11/23/2020 15:15)    Assessment/Plan   Diagnoses and all orders for this visit:    1. Essential hypertension (Primary)-continue lisinopril 20 daily Norvasc 5 daily.    2. Hyperlipidemia, unspecified hyperlipidemia type-continue Pravachol 20 mg daily.    3. Paroxysmal atrial fibrillation (CMS/HCC)-continue aspirin.    4. Coronary artery disease involving native coronary artery of native heart without angina pectoris-continue aspirin 81 mg daily.    5. Gastroesophageal reflux disease without esophagitis      Patient Instructions   Continue your current medications and treatment.    Follow up in the office in 6 months.    Laboratory testing at that time.      Jose Angel Ramirez Jr., MD    01/07/21

## 2021-05-24 ENCOUNTER — OFFICE VISIT (OUTPATIENT)
Dept: CARDIOLOGY | Facility: CLINIC | Age: 81
End: 2021-05-24

## 2021-05-24 VITALS
DIASTOLIC BLOOD PRESSURE: 88 MMHG | HEART RATE: 77 BPM | HEIGHT: 70 IN | OXYGEN SATURATION: 96 % | BODY MASS INDEX: 33.79 KG/M2 | RESPIRATION RATE: 18 BRPM | WEIGHT: 236 LBS | SYSTOLIC BLOOD PRESSURE: 161 MMHG

## 2021-05-24 DIAGNOSIS — I10 ESSENTIAL HYPERTENSION: ICD-10-CM

## 2021-05-24 DIAGNOSIS — I48.0 PAROXYSMAL ATRIAL FIBRILLATION (HCC): Primary | ICD-10-CM

## 2021-05-24 DIAGNOSIS — E78.2 MIXED HYPERLIPIDEMIA: ICD-10-CM

## 2021-05-24 DIAGNOSIS — R00.2 PALPITATIONS: ICD-10-CM

## 2021-05-24 PROCEDURE — 93000 ELECTROCARDIOGRAM COMPLETE: CPT | Performed by: INTERNAL MEDICINE

## 2021-05-24 PROCEDURE — 99213 OFFICE O/P EST LOW 20 MIN: CPT | Performed by: INTERNAL MEDICINE

## 2021-05-24 RX ORDER — LISINOPRIL 40 MG/1
TABLET ORAL
COMMUNITY
Start: 2021-04-23 | End: 2021-05-24

## 2021-05-24 NOTE — PROGRESS NOTES
CC--Atrial fibrillation, hypertension    SUB--80-year-old male patient with history of atrial fibrillation and two-vessel coronary artery disease status post bypass surgery with recurrent atrial arrhythmias in the form of atrial fibrillation and atrial flutter and underwent atrial flutter ablation and post atrial flutter ablation several months later developed atrial fibrillation underwent AF ablation and comes in for follow-up.He is doing remarkably well without symptoms of palpitations or atrial arrhythmias in the last few months and remains in functional class and denies any syncope or chest pain.  chronic medical problems include paroxysmal atrial fibrillation atrial flutter, hypertension, coronary artery disease.  he had Covid infection back in August and during that time he had atrial fibrillation and since then he has recovered very well and back to exercise regimen without recurrent symptoms and he has noticed periodically that his blood pressure has been elevated  He had one episode of AF since last visit        Past Medical History:     Reviewed history from 01/24/2017 and no changes required:        Hyperlipidemia        Hypertension        Obesity        Arthritis        Peripheral vascular disease - < 50% stenoses of the carotids        Atrial fibrillation-Radioablation 12/2016        Prostate biopsy - 2012 - benign        GERD        Coronary artery disease    Past Surgical History:     Reviewed history from 01/16/2017 and no changes required:        Nasal reconstruction        Prostate biopsy(2010)        Parathyroid adenma removed.         Cardioversion - 1/2012; 10/5/2016        CABG - 07/2014        A-flutter ablation 12/2016 [facility]            Review of Systems   General: No fatigue or tiredness, No change in weight   Eyes: No redness  Cardiovascular: No chest pain, positive for  palpitations        Physical Exam    General:      well developed, well nourished, in no acute distress.    Head:       normocephalic and atraumatic.    Eyes:      PERRL/EOM intact, conjunctiva and sclera clear with out nystagmus.    Neck:      no masses, thyromegaly, or abnormal cervical nodes.    Lungs:      clear bilaterally to auscultation.    Heart:   regular rate and rhythm, S1, S2 without murmurs, rubs, or gallops            ASSESSMENT PLAN  Sustained permanent atrial flutter prior ablation without recurrence  Recurrent symptomatic atrial fibrillation with prior complex AF arrhythmia ablation with prior left atrial appendage ligation with bypass surgery  doing fairly well in functional class I  Hypertension--well controlled on  home monitoring  Presence of PFO  Coronary disease  Medications reviewed and follow-up after few months        ECG 12 Lead    Date/Time: 5/24/2021 3:59 PM  Performed by: Mark Nolan MD  Authorized by: Mark Nolan MD   Comparison: compared with previous ECG   Similar to previous ECG  Rhythm: sinus rhythm  Ectopy: atrial premature contractions  Rate: normal  Conduction: 1st degree AV block          Electronically signed by Mark Nolan MD, 05/24/21, 3:59 PM EDT.

## 2021-07-21 ENCOUNTER — OFFICE VISIT (OUTPATIENT)
Dept: FAMILY MEDICINE CLINIC | Facility: CLINIC | Age: 81
End: 2021-07-21

## 2021-07-21 VITALS
HEART RATE: 78 BPM | HEIGHT: 70 IN | TEMPERATURE: 97.1 F | OXYGEN SATURATION: 96 % | SYSTOLIC BLOOD PRESSURE: 137 MMHG | WEIGHT: 237 LBS | DIASTOLIC BLOOD PRESSURE: 73 MMHG | BODY MASS INDEX: 33.93 KG/M2

## 2021-07-21 DIAGNOSIS — I48.0 PAROXYSMAL ATRIAL FIBRILLATION (HCC): Chronic | ICD-10-CM

## 2021-07-21 DIAGNOSIS — I10 ESSENTIAL HYPERTENSION: Primary | Chronic | ICD-10-CM

## 2021-07-21 DIAGNOSIS — Z00.00 ENCOUNTER FOR ANNUAL WELLNESS EXAM IN MEDICARE PATIENT: ICD-10-CM

## 2021-07-21 DIAGNOSIS — E78.5 HYPERLIPIDEMIA, UNSPECIFIED HYPERLIPIDEMIA TYPE: Chronic | ICD-10-CM

## 2021-07-21 DIAGNOSIS — I25.10 CORONARY ARTERY DISEASE INVOLVING NATIVE CORONARY ARTERY OF NATIVE HEART WITHOUT ANGINA PECTORIS: Chronic | ICD-10-CM

## 2021-07-21 PROBLEM — R63.4 WEIGHT LOSS: Status: RESOLVED | Noted: 2020-09-21 | Resolved: 2021-07-21

## 2021-07-21 PROCEDURE — G0439 PPPS, SUBSEQ VISIT: HCPCS | Performed by: FAMILY MEDICINE

## 2021-07-21 PROCEDURE — 99214 OFFICE O/P EST MOD 30 MIN: CPT | Performed by: FAMILY MEDICINE

## 2021-07-21 NOTE — PATIENT INSTRUCTIONS
Continue your current mediciaosn and treatment.    Follow up in the  Office in 6 months.    Laboratory testing at that time.

## 2021-07-21 NOTE — PROGRESS NOTES
"Subjective   Tin Byrne is a 80 y.o. male.     Chief Complaint   Patient presents with   • Medicare Wellness-subsequent     sub MCW   • Hyperlipidemia     6 month f/u   • Hypertension       HPI  Chief complaint: Hypertension hyperlipidemia coronary artery disease atrial fibrillation    Patient is an 80-year-old white male comes in for follow-up and maintenance of his current problems which include    1.  Hypertension-stable-patient on lisinopril 20 mg daily and Norvasc 5 mg daily.  He denied headache lightheadedness dizziness or chest pain.    2.  Hyperlipidemia-stable-patient on Pravachol 20 mg daily.  He denies myalgias and arthralgias.    3.  Coronary artery disease-stable-patient on aspirin 81 mg daily.  He denies chest pain shortness breath orthopnea or PND.    4.  Paroxysmal atrial fibrillation-stable-patient on aspirin 81 mg daily.  Patient is in sinus rhythm today.    5.  Arthritis-stable-patient is on meloxicam.      The following portions of the patient's history were reviewed and updated as appropriate: allergies, current medications, past family history, past medical history, past social history, past surgical history and problem list.    Review of Systems    Objective     /73   Pulse 78   Temp 97.1 °F (36.2 °C) (Infrared)   Ht 177.8 cm (70\")   Wt 108 kg (237 lb)   SpO2 96%   BMI 34.01 kg/m²     Physical Exam  Vitals and nursing note reviewed.   Constitutional:       Appearance: He is well-developed. He is obese.   HENT:      Head: Normocephalic and atraumatic.      Nose: Nose normal.      Mouth/Throat:      Mouth: Mucous membranes are moist.      Pharynx: Oropharynx is clear.   Eyes:      Extraocular Movements: Extraocular movements intact.      Conjunctiva/sclera: Conjunctivae normal.      Pupils: Pupils are equal, round, and reactive to light.   Cardiovascular:      Rate and Rhythm: Normal rate and regular rhythm.      Heart sounds: Normal heart sounds.   Pulmonary:      Effort: " Pulmonary effort is normal.      Breath sounds: Normal breath sounds.   Abdominal:      General: Abdomen is flat. Bowel sounds are normal.      Palpations: Abdomen is soft.   Musculoskeletal:         General: Normal range of motion.      Cervical back: Neck supple.   Skin:     General: Skin is warm and dry.   Neurological:      Mental Status: He is alert and oriented to person, place, and time.   Psychiatric:         Behavior: Behavior normal.         Thought Content: Thought content normal.         Judgment: Judgment normal.           Assessment/Plan   Diagnoses and all orders for this visit:    1. Essential hypertension (Primary)    2. Encounter for annual wellness exam in Medicare patient    3. Hyperlipidemia, unspecified hyperlipidemia type    4. Paroxysmal atrial fibrillation (CMS/[facility])    5. Coronary artery disease involving native coronary artery of native heart without angina pectoris      Patient Instructions   Continue your current mediciaosn and treatment.    Follow up in the  Office in 6 months.    Laboratory testing at that time.      Jose Angel Ramirez Jr., MD    07/21/21

## 2021-07-21 NOTE — PROGRESS NOTES
The ABCs of the Annual Wellness Visit  Subsequent Medicare Wellness Visit    Chief Complaint   Patient presents with   • Medicare Wellness-subsequent     sub MCW   • Hyperlipidemia     6 month f/u   • Hypertension       Subjective   History of Present Illness:  Tin Byrne is a 80 y.o. male who presents for a Subsequent Medicare Wellness Visit.    HEALTH RISK ASSESSMENT    Recent Hospitalizations:  No hospitalization(s) within the last year.    Current Medical Providers:  Patient Care Team:  Jose Angel Ramirez Jr., MD as PCP - General    Smoking Status:  Social History     Tobacco Use   Smoking Status Former Smoker   • Packs/day: 0.25   • Years: 10.00   • Pack years: 2.50   • Types: Cigars   • Quit date: 1967   • Years since quittin.5   Smokeless Tobacco Never Used       Alcohol Consumption:  Social History     Substance and Sexual Activity   Alcohol Use Yes       Depression Screen:   PHQ-2/PHQ-9 Depression Screening 2021   Little interest or pleasure in doing things 0   Feeling down, depressed, or hopeless 0   Total Score 0       Fall Risk Screen:  EUGENIO Fall Risk Assessment was completed, and patient is at LOW risk for falls.Assessment completed on:2021    Health Habits and Functional and Cognitive Screening:  Functional & Cognitive Status 2021   Do you have difficulty preparing food and eating? No   Do you have difficulty bathing yourself, getting dressed or grooming yourself? No   Do you have difficulty using the toilet? No   Do you have difficulty moving around from place to place? No   Do you have trouble with steps or getting out of a bed or a chair? No   Current Diet Well Balanced Diet   Dental Exam Up to date   Eye Exam Up to date   Exercise (times per week) 0 times per week   Current Exercises Include No Regular Exercise   Current Exercise Activities Include -   Do you need help using the phone?  No   Are you deaf or do you have serious difficulty hearing?  Yes   Do you  need help with transportation? No   Do you need help shopping? No   Do you need help preparing meals?  No   Do you need help with housework?  No   Do you need help with laundry? No   Do you need help taking your medications? No   Do you need help managing money? No   Do you ever drive or ride in a car without wearing a seat belt? No   Have you felt unusual stress, anger or loneliness in the last month? No   Who do you live with? Spouse   If you need help, do you have trouble finding someone available to you? No   Have you been bothered in the last four weeks by sexual problems? No   Do you have difficulty concentrating, remembering or making decisions? No         Does the patient have evidence of cognitive impairment? No    Asprin use counseling:Taking ASA appropriately as indicated    Age-appropriate Screening Schedule:  Refer to the list below for future screening recommendations based on patient's age, sex and/or medical conditions. Orders for these recommended tests are listed in the plan section. The patient has been provided with a written plan.    Health Maintenance   Topic Date Due   • LIPID PANEL  07/21/2021 (Originally 2/24/2021)   • INFLUENZA VACCINE  10/01/2021   • TDAP/TD VACCINES (2 - Td or Tdap) 11/04/2024   • ZOSTER VACCINE  Completed          The following portions of the patient's history were reviewed and updated as appropriate: allergies, current medications, past family history, past medical history, past social history, past surgical history and problem list.    Outpatient Medications Prior to Visit   Medication Sig Dispense Refill   • amLODIPine (NORVASC) 5 MG tablet Take 1 tablet by mouth Daily. 90 tablet 3   • aspirin 81 MG EC tablet Take 81 mg by mouth Daily.     • lisinopril (PRINIVIL,ZESTRIL) 20 MG tablet Take 20 mg by mouth Daily.     • meloxicam (MOBIC) 15 MG tablet Take 1 tablet by mouth Daily. (Patient taking differently: Take 15 mg by mouth Daily. Takes as needed) 90 tablet 3   •  "pravastatin (PRAVACHOL) 20 MG tablet Take 20 mg by mouth Daily.       No facility-administered medications prior to visit.       Patient Active Problem List   Diagnosis   • Hypertension   • Hyperlipidemia   • Paroxysmal atrial fibrillation (CMS/HCC)   • Coronary artery disease   • Allergic rhinitis   • Arthritis   • Gastroesophageal reflux disease   • Status post coronary artery bypass graft   • Weight loss       Advanced Care Planning:  ACP discussion was held with the patient during this visit. Patient has an advance directive in EMR which is still valid.     Review of Systems    Compared to one year ago, the patient feels his physical health is the same.  Compared to one year ago, the patient feels his mental health is the same.    Reviewed chart for potential of high risk medication in the elderly: yes  Reviewed chart for potential of harmful drug interactions in the elderly:yes    Objective         Vitals:    07/21/21 1342   BP: 164/90   BP Location: Right arm   Patient Position: Sitting   Cuff Size: Adult   Pulse: 84   Temp: 97.1 °F (36.2 °C)   TempSrc: Infrared   SpO2: 96%   Weight: 108 kg (237 lb)   Height: 177.8 cm (70\")   PainSc: 0-No pain       Body mass index is 34.01 kg/m².  Discussed the patient's BMI with him. The BMI is in the acceptable range.    Physical Exam  Vitals and nursing note reviewed.   Constitutional:       Appearance: He is well-developed and normal weight.   HENT:      Head: Normocephalic and atraumatic.      Nose: Nose normal.      Mouth/Throat:      Mouth: Mucous membranes are moist.      Pharynx: Oropharynx is clear.   Eyes:      Extraocular Movements: Extraocular movements intact.      Conjunctiva/sclera: Conjunctivae normal.      Pupils: Pupils are equal, round, and reactive to light.   Cardiovascular:      Rate and Rhythm: Normal rate and regular rhythm.      Pulses: Normal pulses.      Heart sounds: Normal heart sounds.   Pulmonary:      Effort: Pulmonary effort is normal.      " Breath sounds: Normal breath sounds.   Abdominal:      General: Abdomen is flat. Bowel sounds are normal.      Palpations: Abdomen is soft.   Musculoskeletal:         General: Normal range of motion.      Cervical back: Neck supple.   Skin:     General: Skin is warm and dry.   Neurological:      Mental Status: He is alert and oriented to person, place, and time.   Psychiatric:         Behavior: Behavior normal.         Thought Content: Thought content normal.         Judgment: Judgment normal.               Assessment/Plan   Medicare Risks and Personalized Health Plan  CMS Preventative Services Quick Reference  Advance Directive Discussion  Immunizations Discussed/Encouraged (specific immunizations; Td, Influenza, Pneumococcal 23, Shingrix and COVID19 )    The above risks/problems have been discussed with the patient.  Pertinent information has been shared with the patient in the After Visit Summary.  Follow up plans and orders are seen below in the Assessment/Plan Section.    Diagnoses and all orders for this visit:    1. Encounter for annual wellness exam in Medicare patient (Primary)      Follow Up:  Return in about 6 months (around 1/21/2022).     An After Visit Summary and PPPS were given to the patient.

## 2021-10-11 DIAGNOSIS — J22 ACUTE RESPIRATORY INFECTION: Primary | ICD-10-CM

## 2021-10-12 ENCOUNTER — LAB (OUTPATIENT)
Dept: LAB | Facility: HOSPITAL | Age: 81
End: 2021-10-12

## 2021-10-12 DIAGNOSIS — J22 ACUTE RESPIRATORY INFECTION: ICD-10-CM

## 2021-10-12 LAB — SARS-COV-2 ORF1AB RESP QL NAA+PROBE: NOT DETECTED

## 2021-10-12 PROCEDURE — C9803 HOPD COVID-19 SPEC COLLECT: HCPCS

## 2021-10-12 PROCEDURE — U0004 COV-19 TEST NON-CDC HGH THRU: HCPCS

## 2021-10-13 ENCOUNTER — TELEPHONE (OUTPATIENT)
Dept: FAMILY MEDICINE CLINIC | Facility: CLINIC | Age: 81
End: 2021-10-13

## 2021-10-13 RX ORDER — AMOXICILLIN 875 MG/1
875 TABLET, COATED ORAL 2 TIMES DAILY
Qty: 20 TABLET | Refills: 0 | Status: SHIPPED | OUTPATIENT
Start: 2021-10-13 | End: 2021-12-03

## 2021-10-13 NOTE — TELEPHONE ENCOUNTER
I called and left a message on the patient's voicemail that his Covid test was negative and that I sent a prescription for amoxicillin to his pharmacy.  He is to follow-up in the office if this does not help.

## 2021-10-13 NOTE — TELEPHONE ENCOUNTER
PATIENT IS CALLING IN HE STATES THAT DOCTOR TOLD HIM HE WOULD SEND IN ANTIBIOTIC FOR HIM ONCE HE GOT A COVID TEST COMPLETED HE DID THAT AT THE DRIVE THRU AT [facility] AND THEY ADVISED THAT DOCTOR WOULD SEE RESULTS IN HIS CHART. HE IS NOT HEARD ANYTHING AND WOULD LIKE TO KNOW RESULTS AND IF DOCTOR WILL SEND MEDICATION IN FOR HIM.    PLEASE ADVISE    CALLBACK NUMBER IS  [phone] (    CONFIRMED PHARMACY  [facility]ARDO[facility] [address] [place] [address], [place] [address] - [phone] [facility] [phone] FX

## 2021-12-03 ENCOUNTER — OFFICE VISIT (OUTPATIENT)
Dept: CARDIOLOGY | Facility: CLINIC | Age: 81
End: 2021-12-03

## 2021-12-03 VITALS
OXYGEN SATURATION: 100 % | SYSTOLIC BLOOD PRESSURE: 155 MMHG | WEIGHT: 232 LBS | HEIGHT: 70 IN | DIASTOLIC BLOOD PRESSURE: 83 MMHG | BODY MASS INDEX: 33.21 KG/M2 | HEART RATE: 79 BPM

## 2021-12-03 DIAGNOSIS — E78.2 MIXED HYPERLIPIDEMIA: ICD-10-CM

## 2021-12-03 DIAGNOSIS — I48.0 PAROXYSMAL ATRIAL FIBRILLATION (HCC): Primary | ICD-10-CM

## 2021-12-03 DIAGNOSIS — I10 ESSENTIAL HYPERTENSION: ICD-10-CM

## 2021-12-03 DIAGNOSIS — R00.2 PALPITATIONS: ICD-10-CM

## 2021-12-03 PROCEDURE — 99213 OFFICE O/P EST LOW 20 MIN: CPT | Performed by: INTERNAL MEDICINE

## 2021-12-03 PROCEDURE — 93000 ELECTROCARDIOGRAM COMPLETE: CPT | Performed by: INTERNAL MEDICINE

## 2021-12-03 NOTE — PROGRESS NOTES
CC--Atrial fibrillation, hypertension    SUB--81-year-old male patient with history of atrial fibrillation and two-vessel coronary artery disease status post bypass surgery with recurrent atrial arrhythmias in the form of atrial fibrillation and atrial flutter and underwent atrial flutter ablation and post atrial flutter ablation several months later developed atrial fibrillation underwent AF ablation and comes in for follow-up.He is doing remarkably well without symptoms of palpitations or atrial arrhythmias in the last few months and remains in functional class and denies any syncope or chest pain.  chronic medical problems include paroxysmal atrial fibrillation atrial flutter, hypertension, coronary artery disease.  he had Covid infection back in August and during that time he had atrial fibrillation and since then he has recovered very well and back to exercise regimen without recurrent symptoms and he has noticed periodically that his blood pressure has been elevated  He had one episode of AF since last visit        Past Medical History:     Reviewed history from 01/24/2017 and no changes required:        Hyperlipidemia        Hypertension        Obesity        Arthritis        Peripheral vascular disease - < 50% stenoses of the carotids        Atrial fibrillation-Radioablation 12/2016        Prostate biopsy - 2012 - benign        GERD        Coronary artery disease    Past Surgical History:     Reviewed history from 01/16/2017 and no changes required:        Nasal reconstruction        Prostate biopsy(2010)        Parathyroid adenma removed.         Cardioversion - 1/2012; 10/5/2016        CABG - 07/2014        A-flutter ablation 12/2016 [facility]            Review of Systems   General: No fatigue or tiredness, No change in weight   Eyes: No redness  Cardiovascular: No chest pain, positive for  palpitations        Physical Exam    General:      well developed, well nourished, in no acute distress.    Head:       normocephalic and atraumatic.    Eyes:      PERRL/EOM intact, conjunctiva and sclera clear with out nystagmus.    Neck:      no masses, thyromegaly, or abnormal cervical nodes.    Lungs:      clear bilaterally to auscultation.    Heart:   regular rate and rhythm, S1, S2 without murmurs, rubs, or gallops            ASSESSMENT PLAN  Sustained permanent atrial flutter prior ablation without recurrence  Recurrent symptomatic atrial fibrillation with prior complex AF arrhythmia ablation with prior left atrial appendage ligation with bypass surgery  doing fairly well in functional class I  Hypertension--well controlled on  home monitoring  Presence of PFO  Coronary disease  Medications reviewed and follow-up after few months      ECG 12 Lead    Date/Time: 12/3/2021 10:09 AM  Performed by: Mark Nolan MD  Authorized by: Mark Nolan MD   Comparison: compared with previous ECG   Similar to previous ECG  Rhythm: sinus rhythm  Rate: normal  Conduction: 1st degree AV block  QRS axis: normal            Electronically signed by Mark Nolan MD, 12/03/21, 10:09 AM EST.

## 2022-01-19 ENCOUNTER — OFFICE VISIT (OUTPATIENT)
Dept: FAMILY MEDICINE CLINIC | Facility: CLINIC | Age: 82
End: 2022-01-19

## 2022-01-19 VITALS
RESPIRATION RATE: 18 BRPM | OXYGEN SATURATION: 99 % | WEIGHT: 231 LBS | DIASTOLIC BLOOD PRESSURE: 80 MMHG | TEMPERATURE: 96.8 F | HEIGHT: 70 IN | HEART RATE: 70 BPM | BODY MASS INDEX: 33.07 KG/M2 | SYSTOLIC BLOOD PRESSURE: 141 MMHG

## 2022-01-19 DIAGNOSIS — I10 PRIMARY HYPERTENSION: Primary | Chronic | ICD-10-CM

## 2022-01-19 DIAGNOSIS — J01.01 ACUTE RECURRENT MAXILLARY SINUSITIS: ICD-10-CM

## 2022-01-19 DIAGNOSIS — E78.5 HYPERLIPIDEMIA, UNSPECIFIED HYPERLIPIDEMIA TYPE: Chronic | ICD-10-CM

## 2022-01-19 DIAGNOSIS — K21.9 GASTROESOPHAGEAL REFLUX DISEASE WITHOUT ESOPHAGITIS: Chronic | ICD-10-CM

## 2022-01-19 DIAGNOSIS — I25.10 CORONARY ARTERY DISEASE INVOLVING NATIVE CORONARY ARTERY OF NATIVE HEART WITHOUT ANGINA PECTORIS: Chronic | ICD-10-CM

## 2022-01-19 DIAGNOSIS — I48.0 PAROXYSMAL ATRIAL FIBRILLATION: Chronic | ICD-10-CM

## 2022-01-19 PROCEDURE — 99214 OFFICE O/P EST MOD 30 MIN: CPT | Performed by: FAMILY MEDICINE

## 2022-01-19 RX ORDER — MELOXICAM 15 MG/1
15 TABLET ORAL DAILY
Qty: 90 TABLET | Refills: 3 | Status: ON HOLD | OUTPATIENT
Start: 2022-01-19 | End: 2022-10-04

## 2022-01-19 NOTE — PATIENT INSTRUCTIONS
Continue your current medications and treatment.    Follow up in the office in 6 months.    Laboraotry testing at that time.

## 2022-01-19 NOTE — PROGRESS NOTES
"Subjective   Tin Byrne is a 81 y.o. male.     Chief Complaint   Patient presents with   • Hypertension     6 month followup   • Hyperlipidemia   • Coronary Artery Disease       HPI  Chief complaint: Hypertension hyperlipidemia atrial fibrillation coronary artery disease recurrent maxillary sinusitis    Patient is an 81-year-old white male comes in for follow-up and maintenance of his current problems which include    1.  Hypertension-stable- the  patient on lisinopril 20 mg daily amlodipine 5 mg daily.  He denied headache lightheadedness dizziness or chest pain.    2.  Hyperlipidemia-stable out of is on Pravachol 20 mg daily.  Denies myalgias and arthralgias.    3.  Paroxysmal atrial fibrillation-deteriorated the patient has history of paroxysmal atrial fibrillation.  The patient has had 2 ablations.  Patient had surgical ablation of the left atrial appendage.  Patient's on aspirin 81 mg daily.  He wonders whether there is any can be done to keep him from going in and out of atrial fibrillation.    4.  Coronary artery disease-stable-the patient is current on aspirin 81 mg daily.  Patient had surgery.  Asymptomatic.    5.  Recurrent Sinusitis-unchanged-the patient has history of recurrent sinusitis that requires antibiotic therapy when it is active.    6 arthritis-stable--patient has history of osteoarthritis involving multiple joints.  Patient on Mobic 15 mg once a day..      The following portions of the patient's history were reviewed and updated as appropriate: allergies, current medications, past family history, past medical history, past social history, past surgical history and problem list.    Review of Systems    Objective     /80 (BP Location: Right arm, Patient Position: Sitting, Cuff Size: Adult)   Pulse 70   Temp 96.8 °F (36 °C) (Infrared)   Resp 18   Ht 177.8 cm (70\")   Wt 105 kg (231 lb)   SpO2 99%   BMI 33.15 kg/m²     Physical Exam  Vitals and nursing note reviewed. "   Constitutional:       Appearance: He is well-developed and normal weight.   HENT:      Head: Normocephalic and atraumatic.      Nose: Nose normal.      Mouth/Throat:      Mouth: Mucous membranes are moist.      Pharynx: Oropharynx is clear.   Eyes:      Extraocular Movements: Extraocular movements intact.      Conjunctiva/sclera: Conjunctivae normal.      Pupils: Pupils are equal, round, and reactive to light.   Cardiovascular:      Rate and Rhythm: Normal rate. Rhythm irregularly irregular.      Heart sounds: Normal heart sounds.   Pulmonary:      Effort: Pulmonary effort is normal.      Breath sounds: Normal breath sounds.   Abdominal:      General: Bowel sounds are normal.      Palpations: Abdomen is soft.   Musculoskeletal:         General: Normal range of motion.      Cervical back: Neck supple.   Skin:     General: Skin is warm and dry.   Neurological:      Mental Status: He is alert and oriented to person, place, and time.   Psychiatric:         Behavior: Behavior normal.         Thought Content: Thought content normal.         Judgment: Judgment normal.           Assessment/Plan   Diagnoses and all orders for this visit:    1. Primary hypertension (Primary)    2. Hyperlipidemia, unspecified hyperlipidemia type    3. Paroxysmal atrial fibrillation (HCC)    4. Coronary artery disease involving native coronary artery of native heart without angina pectoris    5. Gastroesophageal reflux disease without esophagitis    6. Acute recurrent maxillary sinusitis    Other orders  -     meloxicam (MOBIC) 15 MG tablet; Take 1 tablet by mouth Daily.  Dispense: 90 tablet; Refill: 3      Patient Instructions   Continue your current medications and treatment.    Follow up in the office in 6 months.    Laboraotry testing at that time.      Jose Angel Ramirez Jr., MD    01/19/22

## 2022-04-22 ENCOUNTER — TELEPHONE (OUTPATIENT)
Dept: FAMILY MEDICINE CLINIC | Facility: CLINIC | Age: 82
End: 2022-04-22

## 2022-04-22 NOTE — TELEPHONE ENCOUNTER
Caller: Tin Byrne    Relationship: Self    Best call back number: [phone]*    What is the best time to reach you: ANYTIME    Who are you requesting to speak with (clinical staff, provider,  specific staff member): CLINICAL    What was the call regarding: PATIENT CALLING REQUESTING TO KNOW WHEN HE ESTABLISHED CARE WITH DR. ROTHMAN. THE PATIENT STATES IT WAS WHEN DR. ROTHMAN WAS IN [place].    Do you require a callback: YES

## 2022-04-22 NOTE — TELEPHONE ENCOUNTER
I spoke with the patient.  He was advised that I started work in [place] in 2002. He was advised that we started working together between 2002 and 2007.

## 2022-06-03 ENCOUNTER — OFFICE VISIT (OUTPATIENT)
Dept: CARDIOLOGY | Facility: CLINIC | Age: 82
End: 2022-06-03

## 2022-06-03 VITALS
DIASTOLIC BLOOD PRESSURE: 89 MMHG | WEIGHT: 227 LBS | BODY MASS INDEX: 32.5 KG/M2 | HEIGHT: 70 IN | HEART RATE: 59 BPM | SYSTOLIC BLOOD PRESSURE: 151 MMHG | OXYGEN SATURATION: 97 %

## 2022-06-03 DIAGNOSIS — R00.2 PALPITATIONS: ICD-10-CM

## 2022-06-03 DIAGNOSIS — I48.0 PAROXYSMAL ATRIAL FIBRILLATION: Primary | ICD-10-CM

## 2022-06-03 DIAGNOSIS — E78.2 MIXED HYPERLIPIDEMIA: ICD-10-CM

## 2022-06-03 DIAGNOSIS — I10 ESSENTIAL HYPERTENSION: ICD-10-CM

## 2022-06-03 PROCEDURE — 99214 OFFICE O/P EST MOD 30 MIN: CPT | Performed by: INTERNAL MEDICINE

## 2022-06-03 PROCEDURE — 93000 ELECTROCARDIOGRAM COMPLETE: CPT | Performed by: INTERNAL MEDICINE

## 2022-06-03 NOTE — PROGRESS NOTES
CC--Atrial fibrillation, hypertension    SUB--81-year-old male patient with history of atrial fibrillation and two-vessel coronary artery disease status post bypass surgery with recurrent atrial arrhythmias in the form of atrial fibrillation and atrial flutter and underwent atrial flutter ablation and post atrial flutter ablation several months later developed atrial fibrillation underwent AF ablation and comes in for follow-up.He is doing remarkably well without symptoms of palpitations or atrial arrhythmias in the last few months and remains in functional class and denies any syncope or chest pain.  chronic medical problems include paroxysmal atrial fibrillation atrial flutter, hypertension, coronary artery disease.  he had Covid infection back in August and during that time he had atrial fibrillation and since then he has recovered very well and back to exercise regimen without recurrent symptoms and he has noticed periodically that his blood pressure has been elevated  He is having recurrent  AF since last visit        Past Medical History:     Reviewed history from 01/24/2017 and no changes required:        Hyperlipidemia        Hypertension        Obesity        Arthritis        Peripheral vascular disease - < 50% stenoses of the carotids        Atrial fibrillation-Radioablation 12/2016        Prostate biopsy - 2012 - benign        GERD        Coronary artery disease    Past Surgical History:     Reviewed history from 01/16/2017 and no changes required:        Nasal reconstruction        Prostate biopsy(2010)        Parathyroid adenma removed.         Cardioversion - 1/2012; 10/5/2016        CABG - 07/2014        A-flutter ablation 12/2016 [facility]            Review of Systems   General: No fatigue or tiredness, No change in weight   Eyes: No redness  Cardiovascular: No chest pain, positive for  palpitations        Physical Exam    General:      well developed, well nourished, in no acute distress.    Head:       normocephalic and atraumatic.    Eyes:      PERRL/EOM intact, conjunctiva and sclera clear with out nystagmus.    Neck:      no masses, thyromegaly, or abnormal cervical nodes.    Lungs:      clear bilaterally to auscultation.    Heart:   regular rate and rhythm, S1, S2 without murmurs, rubs, or gallops            ASSESSMENT PLAN  Sustained permanent atrial flutter prior ablation with recurrence  Recurrent symptomatic atrial fibrillation with prior complex AF arrhythmia ablation with prior left atrial appendage ligation with bypass surgery  doing fairly well in functional class 2--recurrent sxI  Hypertension--well controlled on  home monitoring  Presence of PFO  Coronary disease  Medications reviewed and follow-up after few months  Options educated about recurrent atrial arrhythmias  Follow up in 3 months      ECG 12 Lead    Date/Time: 6/3/2022 10:51 AM  Performed by: Mark Nolan MD  Authorized by: Mark Nolan MD   Comparison: compared with previous ECG   Similar to previous ECG  Rhythm: atrial flutter  Ectopy: infrequent PVCs  Rate: normal  Other findings: non-specific ST-T wave changes            Electronically signed by Mark Nolan MD, 06/03/22, 10:51 AM EDT.

## 2022-07-26 ENCOUNTER — TELEPHONE (OUTPATIENT)
Dept: CARDIOLOGY | Facility: CLINIC | Age: 82
End: 2022-07-26

## 2022-07-26 NOTE — TELEPHONE ENCOUNTER
----- Message from Mark HOLLY MD sent at 7/22/2022  8:10 AM EDT -----  Bring him to the office    Dr LOVE  ----- Message -----  From: Sienna Gunter MA  Sent: 7/21/2022   3:33 PM EDT  To: Mark HOLLY MD    Pt following up on his Afib, he stated he feels like he is in Afib more then he is out of it. He is not scheduled to follow up with you until 9/16. He said you mentioned a 3rd ablation. Please advise

## 2022-08-22 ENCOUNTER — OFFICE VISIT (OUTPATIENT)
Dept: CARDIOLOGY | Facility: CLINIC | Age: 82
End: 2022-08-22

## 2022-08-22 VITALS
SYSTOLIC BLOOD PRESSURE: 164 MMHG | OXYGEN SATURATION: 98 % | DIASTOLIC BLOOD PRESSURE: 68 MMHG | HEART RATE: 73 BPM | WEIGHT: 228 LBS | BODY MASS INDEX: 32.64 KG/M2 | HEIGHT: 70 IN

## 2022-08-22 DIAGNOSIS — I48.19 PERSISTENT ATRIAL FIBRILLATION: ICD-10-CM

## 2022-08-22 DIAGNOSIS — I25.10 CORONARY ARTERY DISEASE INVOLVING NATIVE CORONARY ARTERY OF NATIVE HEART WITHOUT ANGINA PECTORIS: Primary | ICD-10-CM

## 2022-08-22 DIAGNOSIS — R00.2 PALPITATIONS: ICD-10-CM

## 2022-08-22 DIAGNOSIS — R06.09 DYSPNEA ON EXERTION: ICD-10-CM

## 2022-08-22 DIAGNOSIS — I10 ESSENTIAL HYPERTENSION: ICD-10-CM

## 2022-08-22 DIAGNOSIS — E78.2 MIXED HYPERLIPIDEMIA: ICD-10-CM

## 2022-08-22 PROCEDURE — 93000 ELECTROCARDIOGRAM COMPLETE: CPT | Performed by: INTERNAL MEDICINE

## 2022-08-22 PROCEDURE — 99214 OFFICE O/P EST MOD 30 MIN: CPT | Performed by: INTERNAL MEDICINE

## 2022-08-22 NOTE — PROGRESS NOTES
CC--Atrial fibrillation, hypertension    SUB--81-year-old male patient well-known to me with history of atrial fibrillation and two-vessel coronary disease with prior bypass surgery with recurrent atrial arrhythmias came for follow-up.  Patient had remote history of right atrial flutter ablation and later AF ablation and he was doing fairly well and started having long spells of atrial arrhythmia which prompted to see me in the office today.  His other medical problems include hypertension coronary artery disease and hyperlipidemia.  AF ablation in 11/2017  Had Covid 3 weeks ago  Feels dyspneic on exertion      Past Medical History:     Reviewed history from 01/24/2017 and no changes required:        Hyperlipidemia        Hypertension        Obesity        Arthritis        Peripheral vascular disease - < 50% stenoses of the carotids        Atrial fibrillation-Radioablation 12/2016        Prostate biopsy - 2012 - benign        GERD        Coronary artery disease    Past Surgical History:     Reviewed history from 01/16/2017 and no changes required:        Nasal reconstruction        Prostate biopsy(2010)        Parathyroid adenma removed.         Cardioversion - 1/2012; 10/5/2016        CABG - 07/2014        A-flutter ablation 12/2016 [facility]          Physical Exam    General:      well developed, well nourished, in no acute distress.    Head:      normocephalic and atraumatic.    Eyes:      PERRL/EOM intact, conjunctivae and sclerae clear without nystagmus.    Neck:      no  thyromegaly, trachea central with normal respiratory effort  Lungs:      clear bilaterally to auscultation.    Heart:      irregular rate and rhythm, S1, S2 without murmurs, rubs, or gallops  Skin:      intact without lesions or rashes.    Psych:      alert and cooperative; normal mood and affect; normal attention span and concentration.            ASSESSMENT PLAN  Sustained permanent atrial flutter prior ablation with recurrence  Recurrent  symptomatic atrial fibrillation with prior complex AF arrhythmia ablation with prior left atrial appendage ligation   Two-vessel coronary disease with prior bypass surgery  Presence of a PFO  Essential hypertension on lisinopril and amlodipine  Hyperlipidemia on pravastatin  AF ablation in 2017  CABG--2014  Dyspnea on exertion    Get stress test to evaluate ischemia      ECG 12 Lead    Date/Time: 8/22/2022 3:31 PM  Performed by: Mark Nolan MD  Authorized by: Mark Nolan MD   Comparison: compared with previous ECG   Similar to previous ECG  Rhythm: atrial fibrillation  Rate: normal  Conduction: conduction normal  Other findings: non-specific ST-T wave changes          Electronically signed by Mark Nolan MD, 08/22/22, 3:31 PM EDT.

## 2022-08-30 ENCOUNTER — OFFICE VISIT (OUTPATIENT)
Dept: FAMILY MEDICINE CLINIC | Facility: CLINIC | Age: 82
End: 2022-08-30

## 2022-08-30 VITALS
BODY MASS INDEX: 32.64 KG/M2 | HEART RATE: 83 BPM | RESPIRATION RATE: 16 BRPM | WEIGHT: 228 LBS | HEIGHT: 70 IN | DIASTOLIC BLOOD PRESSURE: 82 MMHG | OXYGEN SATURATION: 97 % | SYSTOLIC BLOOD PRESSURE: 155 MMHG | TEMPERATURE: 96.8 F

## 2022-08-30 DIAGNOSIS — I10 PRIMARY HYPERTENSION: Chronic | ICD-10-CM

## 2022-08-30 DIAGNOSIS — I25.10 CORONARY ARTERY DISEASE INVOLVING NATIVE CORONARY ARTERY OF NATIVE HEART WITHOUT ANGINA PECTORIS: Chronic | ICD-10-CM

## 2022-08-30 DIAGNOSIS — Z00.00 ENCOUNTER FOR ANNUAL WELLNESS EXAM IN MEDICARE PATIENT: Primary | ICD-10-CM

## 2022-08-30 DIAGNOSIS — K21.9 GASTROESOPHAGEAL REFLUX DISEASE WITHOUT ESOPHAGITIS: Chronic | ICD-10-CM

## 2022-08-30 DIAGNOSIS — Z95.1 STATUS POST CORONARY ARTERY BYPASS GRAFT: ICD-10-CM

## 2022-08-30 DIAGNOSIS — E78.5 HYPERLIPIDEMIA, UNSPECIFIED HYPERLIPIDEMIA TYPE: Chronic | ICD-10-CM

## 2022-08-30 PROBLEM — J01.01 ACUTE RECURRENT MAXILLARY SINUSITIS: Status: RESOLVED | Noted: 2022-01-19 | Resolved: 2022-08-30

## 2022-08-30 PROCEDURE — 1170F FXNL STATUS ASSESSED: CPT | Performed by: FAMILY MEDICINE

## 2022-08-30 PROCEDURE — G0439 PPPS, SUBSEQ VISIT: HCPCS | Performed by: FAMILY MEDICINE

## 2022-08-30 PROCEDURE — 1160F RVW MEDS BY RX/DR IN RCRD: CPT | Performed by: FAMILY MEDICINE

## 2022-08-30 PROCEDURE — 1126F AMNT PAIN NOTED NONE PRSNT: CPT | Performed by: FAMILY MEDICINE

## 2022-08-30 NOTE — PROGRESS NOTES
"Subjective   Tin Byrne is a 81 y.o. male.     Chief Complaint   Patient presents with   • Medicare Wellness-subsequent   • Hypertension     6 mth f/u   • Hyperlipidemia       HPI chief complaint: Hypertension hyperlipidemia atrial fibrillation coronary artery disease gastroesophageal reflux disease    The patient is an 81-year-old white male comes in for follow-up and maintenance of his current problems which include    1.  Hypertension-stable-remains on lisinopril 20 mg daily aspirin 81 mg daily and amlodipine 5 mg daily.  Blood pressure stable.    2.  Hyperlipidemia-stable Adapin is on Pravachol 20 mg daily.  Denies myalgias and arthralgias.      3. atrial fibrillation-stable-the patient is currently on aspirin 81 mg daily.  The patient is in atrial for him.  He sees electrophysiologist.  He has had ablations done several times in the past.  He is considering having another ablation.    4.  Coronary artery disease-stable Adapin is on aspirin 81 mg daily.  He denied chest pain shortness of breath orthopnea or PND.        The following portions of the patient's history were reviewed and updated as appropriate: allergies, current medications, past family history, past medical history, past social history, past surgical history and problem list.    Review of Systems    Objective     /82 (BP Location: Right arm, Patient Position: Sitting, Cuff Size: Adult)   Pulse 83   Temp 96.8 °F (36 °C) (Infrared)   Resp 16   Ht 177.8 cm (70\")   Wt 103 kg (228 lb)   SpO2 97%   BMI 32.71 kg/m²     Physical Exam  Vitals and nursing note reviewed.   Constitutional:       Appearance: He is well-developed.   HENT:      Head: Normocephalic and atraumatic.   Eyes:      Pupils: Pupils are equal, round, and reactive to light.   Cardiovascular:      Rate and Rhythm: Normal rate. Rhythm irregularly irregular.      Pulses: Normal pulses.      Heart sounds: Normal heart sounds. No murmur heard.    No friction rub. No " gallop.   Pulmonary:      Effort: Pulmonary effort is normal.      Breath sounds: Normal breath sounds.   Abdominal:      General: Bowel sounds are normal.      Palpations: Abdomen is soft.   Musculoskeletal:         General: Normal range of motion.      Cervical back: Neck supple.   Skin:     General: Skin is warm and dry.   Neurological:      Mental Status: He is alert and oriented to person, place, and time.   Psychiatric:         Behavior: Behavior normal.         Thought Content: Thought content normal.         Judgment: Judgment normal.           Assessment & Plan   Diagnoses and all orders for this visit:    1. Encounter for annual wellness exam in Medicare patient (Primary)    2. Primary hypertension    3. Hyperlipidemia, unspecified hyperlipidemia type    4. Coronary artery disease involving native coronary artery of native heart without angina pectoris    5. Status post coronary artery bypass graft    6. Gastroesophageal reflux disease without esophagitis      Patient Instructions   Continue your current medications and treatment.    Follow up in 6 months.    Send me copies of the recent labs.      Jose Angel Ramirez Jr., MD    08/30/22

## 2022-08-30 NOTE — PATIENT INSTRUCTIONS
Continue your current medications and treatment.    Follow up in 6 months.    Send me copies of the recent labs.

## 2022-08-30 NOTE — PROGRESS NOTES
The ABCs of the Annual Wellness Visit  Subsequent Medicare Wellness Visit    Chief Complaint   Patient presents with   • Medicare Wellness-subsequent   • Hypertension     6 mth f/u   • Hyperlipidemia      Subjective    History of Present Illness:  Tin Byrne is a 81 y.o. male who presents for a Subsequent Medicare Wellness Visit.    His current problems include    1.  Hypertension-stable Adapin is on lisinopril 20 mg daily amlodipine 5 mg daily.  Blood pressure stable.    2.  Hyperlipidemia-stable Adapin is on pravastatin 20 mg daily.  He denies myalgias and arthralgias.    3.  Atrial fibrillation-stable after patient has chronic atrial fibrillation.  Patient is on aspirin 81 mg daily.  Patient's had several ablations in the past.  He is considering having another ablation.    4.  Osteoarthritis-stable-patient on Mobic 15 mg daily.    5.  Coronary artery disease-stable Hydraphase on aspirin.  Denies chest pain shortness of breath orthopnea or PND.  The following portions of the patient's history were reviewed and   updated as appropriate: allergies, current medications, past family history, past medical history, past social history, past surgical history and problem list.    Compared to one year ago, the patient feels his physical   health is the same.    Compared to one year ago, the patient feels his mental   health is the same.    Recent Hospitalizations:  He was not admitted to the hospital during the last year.       Current Medical Providers:  Patient Care Team:  Jose Angel Ramirez Jr., MD as PCP - General    Outpatient Medications Prior to Visit   Medication Sig Dispense Refill   • amLODIPine (NORVASC) 5 MG tablet Take 1 tablet by mouth Daily. 90 tablet 3   • aspirin 81 MG EC tablet Take 81 mg by mouth Daily.     • lisinopril (PRINIVIL,ZESTRIL) 20 MG tablet Take 20 mg by mouth Daily.     • meloxicam (MOBIC) 15 MG tablet Take 1 tablet by mouth Daily. 90 tablet 3   • pravastatin (PRAVACHOL) 20 MG  "tablet Take 20 mg by mouth Daily.       No facility-administered medications prior to visit.       No opioid medication identified on active medication list. I have reviewed chart for other potential  high risk medication/s and harmful drug interactions in the elderly.          Aspirin is on active medication list. Aspirin use is indicated based on review of current medical condition/s. Pros and cons of this therapy have been discussed today. Benefits of this medication outweigh potential harm.  Patient has been encouraged to continue taking this medication.  .      Patient Active Problem List   Diagnosis   • Hypertension   • Hyperlipidemia   • Paroxysmal atrial fibrillation (HCC)   • Coronary artery disease   • Arthritis   • Gastroesophageal reflux disease   • Status post coronary artery bypass graft     Advance Care Planning  Advance Directive is on file.  ACP discussion was held with the patient during this visit. Patient has an advance directive in EMR which is still valid.           Objective    Vitals:    08/30/22 1129   BP: 155/82   BP Location: Right arm   Patient Position: Sitting   Cuff Size: Adult   Pulse: 83   Resp: 16   Temp: 96.8 °F (36 °C)   TempSrc: Infrared   SpO2: 97%   Weight: 103 kg (228 lb)   Height: 177.8 cm (70\")   PainSc: 0-No pain     Estimated body mass index is 32.71 kg/m² as calculated from the following:    Height as of this encounter: 177.8 cm (70\").    Weight as of this encounter: 103 kg (228 lb).    BMI is >= 30 and <35. (Class 1 Obesity). The following options were offered after discussion;: exercise counseling/recommendations      Does the patient have evidence of cognitive impairment? No    Physical Exam  Vitals and nursing note reviewed.   Constitutional:       Appearance: He is well-developed.   HENT:      Head: Normocephalic and atraumatic.   Eyes:      Pupils: Pupils are equal, round, and reactive to light.   Cardiovascular:      Rate and Rhythm: Normal rate. Rhythm irregularly " irregular.      Pulses: Normal pulses.      Heart sounds: Normal heart sounds. No murmur heard.    No friction rub. No gallop.   Pulmonary:      Effort: Pulmonary effort is normal.      Breath sounds: Normal breath sounds.   Abdominal:      General: Bowel sounds are normal.      Palpations: Abdomen is soft.   Musculoskeletal:         General: Normal range of motion.      Cervical back: Neck supple.   Skin:     General: Skin is warm and dry.   Neurological:      Mental Status: He is alert and oriented to person, place, and time.   Psychiatric:         Behavior: Behavior normal.         Thought Content: Thought content normal.         Judgment: Judgment normal.                 HEALTH RISK ASSESSMENT    Smoking Status:  Social History     Tobacco Use   Smoking Status Former Smoker   • Packs/day: 0.25   • Years: 10.00   • Pack years: 2.50   • Types: Cigars   • Quit date: 1967   • Years since quittin.6   Smokeless Tobacco Never Used     Alcohol Consumption:  Social History     Substance and Sexual Activity   Alcohol Use Yes     Fall Risk Screen:    [facility], and patient is at LOW risk for falls.Assessment completed on:2022    Depression Screening:  PHQ-2/PHQ-9 Depression Screening 2022   Retired PHQ-9 Total Score -   Retired Total Score -   Little Interest or Pleasure in Doing Things 0-->not at all   Feeling Down, Depressed or Hopeless 0-->not at all   PHQ-9: Brief Depression Severity Measure Score 0       Health Habits and Functional and Cognitive Screening:  Functional & Cognitive Status 2022   Do you have difficulty preparing food and eating? No   Do you have difficulty bathing yourself, getting dressed or grooming yourself? No   Do you have difficulty using the toilet? No   Do you have difficulty moving around from place to place? No   Do you have trouble with steps or getting out of a bed or a chair? No   Current Diet Well Balanced Diet   Dental Exam Up to  date   Eye Exam Up to date   Exercise (times per week) 6 times per week   Current Exercises Include Treadmill   Current Exercise Activities Include -   Do you need help using the phone?  No   Are you deaf or do you have serious difficulty hearing?  Yes   Do you need help with transportation? No   Do you need help shopping? No   Do you need help preparing meals?  No   Do you need help with housework?  No   Do you need help with laundry? No   Do you need help taking your medications? No   Do you need help managing money? No   Do you ever drive or ride in a car without wearing a seat belt? No   Have you felt unusual stress, anger or loneliness in the last month? No   Who do you live with? Spouse   If you need help, do you have trouble finding someone available to you? No   Have you been bothered in the last four weeks by sexual problems? No   Do you have difficulty concentrating, remembering or making decisions? No       Age-appropriate Screening Schedule:  Refer to the list below for future screening recommendations based on patient's age, sex and/or medical conditions. Orders for these recommended tests are listed in the plan section. The patient has been provided with a written plan.    Health Maintenance   Topic Date Due   • INFLUENZA VACCINE  10/01/2022   • LIPID PANEL  11/05/2022   • TDAP/TD VACCINES (2 - Td or Tdap) 11/04/2024   • ZOSTER VACCINE  Completed              Assessment & Plan   CMS Preventative Services Quick Reference  Risk Factors Identified During Encounter  Immunizations Discussed/Encouraged (specific Immunizations; Td, Influenza, Pneumococcal 23, Prevnar 20 (Pneumococcal 20-valent conjugate), Vaxneuvance (Pneumococcal 15-valent conjugate), Shingrix and COVID19  The above risks/problems have been discussed with the patient.  Follow up actions/plans if indicated are seen below in the Assessment/Plan Section.  Pertinent information has been shared with the patient in the After Visit  Summary.    Diagnoses and all orders for this visit:    1. Encounter for annual wellness exam in Medicare patient (Primary)    2. Primary hypertension    3. Hyperlipidemia, unspecified hyperlipidemia type    4. Coronary artery disease involving native coronary artery of native heart without angina pectoris    5. Status post coronary artery bypass graft    6. Gastroesophageal reflux disease without esophagitis        Follow Up:   Return in about 6 months (around 2/28/2023).     An After Visit Summary and PPPS were made available to the patient.

## 2022-09-08 ENCOUNTER — APPOINTMENT (OUTPATIENT)
Dept: CARDIOLOGY | Facility: HOSPITAL | Age: 82
End: 2022-09-08

## 2022-09-12 ENCOUNTER — HOSPITAL ENCOUNTER (OUTPATIENT)
Dept: CARDIOLOGY | Facility: HOSPITAL | Age: 82
Discharge: HOME | End: 2022-09-12

## 2022-09-12 DIAGNOSIS — R00.2 PALPITATIONS: ICD-10-CM

## 2022-09-12 DIAGNOSIS — I48.19 PERSISTENT ATRIAL FIBRILLATION: ICD-10-CM

## 2022-09-12 DIAGNOSIS — I25.10 CORONARY ARTERY DISEASE INVOLVING NATIVE CORONARY ARTERY OF NATIVE HEART WITHOUT ANGINA PECTORIS: ICD-10-CM

## 2022-09-12 DIAGNOSIS — R06.09 DYSPNEA ON EXERTION: ICD-10-CM

## 2022-09-12 PROCEDURE — 0 TECHNETIUM SESTAMIBI: Performed by: INTERNAL MEDICINE

## 2022-09-12 PROCEDURE — 25010000002 REGADENOSON 0.4 MG/5ML SOLUTION: Performed by: INTERNAL MEDICINE

## 2022-09-12 PROCEDURE — 78452 HT MUSCLE IMAGE SPECT MULT: CPT

## 2022-09-12 PROCEDURE — 93018 CV STRESS TEST I&R ONLY: CPT | Performed by: INTERNAL MEDICINE

## 2022-09-12 PROCEDURE — 93016 CV STRESS TEST SUPVJ ONLY: CPT | Performed by: INTERNAL MEDICINE

## 2022-09-12 PROCEDURE — 93017 CV STRESS TEST TRACING ONLY: CPT

## 2022-09-12 PROCEDURE — 78452 HT MUSCLE IMAGE SPECT MULT: CPT | Performed by: INTERNAL MEDICINE

## 2022-09-12 PROCEDURE — A9500 TC99M SESTAMIBI: HCPCS | Performed by: INTERNAL MEDICINE

## 2022-09-12 RX ADMIN — TECHNETIUM TC 99M SESTAMIBI 1 DOSE: 1 INJECTION INTRAVENOUS at 11:28

## 2022-09-12 RX ADMIN — TECHNETIUM TC 99M SESTAMIBI 1 DOSE: 1 INJECTION INTRAVENOUS at 13:16

## 2022-09-12 RX ADMIN — REGADENOSON 0.4 MG: 0.08 INJECTION, SOLUTION INTRAVENOUS at 13:16

## 2022-09-16 ENCOUNTER — PREP FOR SURGERY (OUTPATIENT)
Dept: OTHER | Facility: HOSPITAL | Age: 82
End: 2022-09-16

## 2022-09-16 ENCOUNTER — OFFICE VISIT (OUTPATIENT)
Dept: CARDIOLOGY | Facility: CLINIC | Age: 82
End: 2022-09-16

## 2022-09-16 VITALS
DIASTOLIC BLOOD PRESSURE: 78 MMHG | HEART RATE: 75 BPM | WEIGHT: 230 LBS | BODY MASS INDEX: 32.93 KG/M2 | OXYGEN SATURATION: 97 % | SYSTOLIC BLOOD PRESSURE: 140 MMHG | HEIGHT: 70 IN

## 2022-09-16 DIAGNOSIS — I25.10 CORONARY ARTERY DISEASE INVOLVING NATIVE CORONARY ARTERY OF NATIVE HEART WITHOUT ANGINA PECTORIS: Primary | ICD-10-CM

## 2022-09-16 DIAGNOSIS — I48.0 PAROXYSMAL ATRIAL FIBRILLATION: ICD-10-CM

## 2022-09-16 DIAGNOSIS — R06.09 DYSPNEA ON EXERTION: ICD-10-CM

## 2022-09-16 DIAGNOSIS — R00.2 PALPITATIONS: ICD-10-CM

## 2022-09-16 DIAGNOSIS — I10 ESSENTIAL HYPERTENSION: ICD-10-CM

## 2022-09-16 DIAGNOSIS — R00.2 PALPITATIONS: Primary | ICD-10-CM

## 2022-09-16 LAB
BH CV REST NUCLEAR ISOTOPE DOSE: 11.5 MCI
BH CV STRESS COMMENTS STAGE 1: NORMAL
BH CV STRESS DOSE REGADENOSON STAGE 1: 0.4
BH CV STRESS DURATION MIN STAGE 1: 0
BH CV STRESS DURATION SEC STAGE 1: 10
BH CV STRESS NUCLEAR ISOTOPE DOSE: 36 MCI
BH CV STRESS PROTOCOL 1: NORMAL
BH CV STRESS RECOVERY BP: NORMAL MMHG
BH CV STRESS RECOVERY HR: 82 BPM
BH CV STRESS STAGE 1: 1
LV EF NUC BP: 60 %
MAXIMAL PREDICTED HEART RATE: 138 BPM
PERCENT MAX PREDICTED HR: 60.14 %
STRESS BASELINE BP: NORMAL MMHG
STRESS BASELINE HR: 64 BPM
STRESS PERCENT HR: 71 %
STRESS POST PEAK BP: NORMAL MMHG
STRESS POST PEAK HR: 83 BPM
STRESS TARGET HR: 117 BPM

## 2022-09-16 PROCEDURE — 99214 OFFICE O/P EST MOD 30 MIN: CPT | Performed by: INTERNAL MEDICINE

## 2022-09-16 RX ORDER — SODIUM CHLORIDE 0.9 % (FLUSH) 0.9 %
3 SYRINGE (ML) INJECTION EVERY 12 HOURS SCHEDULED
Status: CANCELLED | OUTPATIENT
Start: 2022-09-16

## 2022-09-16 RX ORDER — SODIUM CHLORIDE 9 MG/ML
80 INJECTION, SOLUTION INTRAVENOUS CONTINUOUS
Status: CANCELLED | OUTPATIENT
Start: 2022-09-16

## 2022-09-16 RX ORDER — SODIUM CHLORIDE 0.9 % (FLUSH) 0.9 %
3-10 SYRINGE (ML) INJECTION AS NEEDED
Status: CANCELLED | OUTPATIENT
Start: 2022-09-16

## 2022-09-16 NOTE — H&P (VIEW-ONLY)
CC--Atrial fibrillation, hypertension    SUB--81-year-old male patient underwent a stress test in our office which showed normal EF without ischemia.  Continues to have intermittent atrial arrhythmias and feels symptomatic with fatigue.  He underwent AF ablation thousand 17 and had COVID few weeks ago.  Feels dyspneic on exertion.  Other medical problems include hypertension hyperlipidemia and coronary disease.      Past Medical History:     Reviewed history from 01/24/2017 and no changes required:        Hyperlipidemia        Hypertension        Obesity        Arthritis        Peripheral vascular disease - < 50% stenoses of the carotids        Atrial fibrillation-Radioablation 12/2016        Prostate biopsy - 2012 - benign        GERD        Coronary artery disease    Past Surgical History:     Reviewed history from 01/16/2017 and no changes required:        Nasal reconstruction        Prostate biopsy(2010)        Parathyroid adenma removed.         Cardioversion - 1/2012; 10/5/2016        CABG - 07/2014        A-flutter ablation 12/2016 [facility]          Physical Exam    General:      well developed, well nourished, in no acute distress.    Head:      normocephalic and atraumatic.    Eyes:      PERRL/EOM intact, conjunctivae and sclerae clear without nystagmus.    Neck:      no  thyromegaly, trachea central with normal respiratory effort  Lungs:      clear bilaterally to auscultation.    Heart:       irregular rate and rhythm, S1, S2 without murmurs, rubs, or gallops  Skin:      intact without lesions or rashes.    Psych:      alert and cooperative; normal mood and affect; normal attention span and concentration.          ASSESSMENT PLAN    Recurrent symptomatic atrial arrhythmias therapeutic options discussed with the patient and after discussing the options patient opted for ablation treatment.  A CARLA preceding the EP study and ablation is ordered and orders for CARLA, EP study and ablation have been placed.  Risks and  benefits and outcomes educated.  Two-vessel coronary artery disease prior bypass surgery left atrial appendage ligation--no ischemia on stress test which was discussed with the patient  Essential hypertension well-controlled  Presence of a PFO  Persistent AF needing complex AF ablation back in 2017 with recurrence  Hyperlipidemia treated        Electronically signed by Mark Nolan MD, 09/16/22, 12:47 PM EDT.

## 2022-09-16 NOTE — PROGRESS NOTES
CC--Atrial fibrillation, hypertension    SUB--81-year-old male patient underwent a stress test in our office which showed normal EF without ischemia.  Continues to have intermittent atrial arrhythmias and feels symptomatic with fatigue.  He underwent AF ablation thousand 17 and had COVID few weeks ago.  Feels dyspneic on exertion.  Other medical problems include hypertension hyperlipidemia and coronary disease.      Past Medical History:     Reviewed history from 01/24/2017 and no changes required:        Hyperlipidemia        Hypertension        Obesity        Arthritis        Peripheral vascular disease - < 50% stenoses of the carotids        Atrial fibrillation-Radioablation 12/2016        Prostate biopsy - 2012 - benign        GERD        Coronary artery disease    Past Surgical History:     Reviewed history from 01/16/2017 and no changes required:        Nasal reconstruction        Prostate biopsy(2010)        Parathyroid adenma removed.         Cardioversion - 1/2012; 10/5/2016        CABG - 07/2014        A-flutter ablation 12/2016 [facility]          Physical Exam    General:      well developed, well nourished, in no acute distress.    Head:      normocephalic and atraumatic.    Eyes:      PERRL/EOM intact, conjunctivae and sclerae clear without nystagmus.    Neck:      no  thyromegaly, trachea central with normal respiratory effort  Lungs:      clear bilaterally to auscultation.    Heart:       irregular rate and rhythm, S1, S2 without murmurs, rubs, or gallops  Skin:      intact without lesions or rashes.    Psych:      alert and cooperative; normal mood and affect; normal attention span and concentration.          ASSESSMENT PLAN    Recurrent symptomatic atrial arrhythmias therapeutic options discussed with the patient and after discussing the options patient opted for ablation treatment.  A CARLA preceding the EP study and ablation is ordered and orders for CARLA, EP study and ablation have been placed.  Risks and  benefits and outcomes educated.  Two-vessel coronary artery disease prior bypass surgery left atrial appendage ligation--no ischemia on stress test which was discussed with the patient  Essential hypertension well-controlled  Presence of a PFO  Persistent AF needing complex AF ablation back in 2017 with recurrence  Hyperlipidemia treated        Electronically signed by Mark Nolan MD, 09/16/22, 12:47 PM EDT.

## 2022-10-01 ENCOUNTER — LAB (OUTPATIENT)
Dept: LAB | Facility: HOSPITAL | Age: 82
End: 2022-10-01

## 2022-10-01 DIAGNOSIS — R06.09 DYSPNEA ON EXERTION: ICD-10-CM

## 2022-10-01 DIAGNOSIS — I48.0 PAROXYSMAL ATRIAL FIBRILLATION: ICD-10-CM

## 2022-10-01 DIAGNOSIS — R00.2 PALPITATIONS: ICD-10-CM

## 2022-10-01 LAB
ALBUMIN SERPL-MCNC: 4.2 G/DL (ref 3.5–5.2)
ALBUMIN/GLOB SERPL: 1.6 G/DL
ALP SERPL-CCNC: 46 U/L (ref 39–117)
ALT SERPL W P-5'-P-CCNC: 17 U/L (ref 1–41)
ANION GAP SERPL CALCULATED.3IONS-SCNC: 11 MMOL/L (ref 5–15)
AST SERPL-CCNC: 17 U/L (ref 1–40)
BASOPHILS # BLD AUTO: 0.04 10*3/MM3 (ref 0–0.2)
BASOPHILS NFR BLD AUTO: 0.7 % (ref 0–1.5)
BILIRUB SERPL-MCNC: 0.5 MG/DL (ref 0–1.2)
BUN SERPL-MCNC: 18 MG/DL (ref 8–23)
BUN/CREAT SERPL: 18.4 (ref 7–25)
CALCIUM SPEC-SCNC: 9.7 MG/DL (ref 8.6–10.5)
CHLORIDE SERPL-SCNC: 102 MMOL/L (ref 98–107)
CO2 SERPL-SCNC: 25 MMOL/L (ref 22–29)
CREAT SERPL-MCNC: 0.98 MG/DL (ref 0.76–1.27)
DEPRECATED RDW RBC AUTO: 45.2 FL (ref 37–54)
EGFRCR SERPLBLD CKD-EPI 2021: 77 ML/MIN/1.73
EOSINOPHIL # BLD AUTO: 0.26 10*3/MM3 (ref 0–0.4)
EOSINOPHIL NFR BLD AUTO: 4.3 % (ref 0.3–6.2)
ERYTHROCYTE [DISTWIDTH] IN BLOOD BY AUTOMATED COUNT: 13.8 % (ref 12.3–15.4)
GLOBULIN UR ELPH-MCNC: 2.6 GM/DL
GLUCOSE SERPL-MCNC: 97 MG/DL (ref 65–99)
HCT VFR BLD AUTO: 47.6 % (ref 37.5–51)
HGB BLD-MCNC: 15.9 G/DL (ref 13–17.7)
IMM GRANULOCYTES # BLD AUTO: 0.02 10*3/MM3 (ref 0–0.05)
IMM GRANULOCYTES NFR BLD AUTO: 0.3 % (ref 0–0.5)
LYMPHOCYTES # BLD AUTO: 1.56 10*3/MM3 (ref 0.7–3.1)
LYMPHOCYTES NFR BLD AUTO: 25.7 % (ref 19.6–45.3)
MAGNESIUM SERPL-MCNC: 2.3 MG/DL (ref 1.6–2.4)
MCH RBC QN AUTO: 29.9 PG (ref 26.6–33)
MCHC RBC AUTO-ENTMCNC: 33.4 G/DL (ref 31.5–35.7)
MCV RBC AUTO: 89.5 FL (ref 79–97)
MONOCYTES # BLD AUTO: 0.62 10*3/MM3 (ref 0.1–0.9)
MONOCYTES NFR BLD AUTO: 10.2 % (ref 5–12)
NEUTROPHILS NFR BLD AUTO: 3.58 10*3/MM3 (ref 1.7–7)
NEUTROPHILS NFR BLD AUTO: 58.8 % (ref 42.7–76)
NRBC BLD AUTO-RTO: 0 /100 WBC (ref 0–0.2)
PLATELET # BLD AUTO: 151 10*3/MM3 (ref 140–450)
PMV BLD AUTO: 12.4 FL (ref 6–12)
POTASSIUM SERPL-SCNC: 4.5 MMOL/L (ref 3.5–5.2)
PROT SERPL-MCNC: 6.8 G/DL (ref 6–8.5)
RBC # BLD AUTO: 5.32 10*6/MM3 (ref 4.14–5.8)
SODIUM SERPL-SCNC: 138 MMOL/L (ref 136–145)
WBC NRBC COR # BLD: 6.08 10*3/MM3 (ref 3.4–10.8)

## 2022-10-01 PROCEDURE — 80053 COMPREHEN METABOLIC PANEL: CPT

## 2022-10-01 PROCEDURE — 83735 ASSAY OF MAGNESIUM: CPT

## 2022-10-01 PROCEDURE — 85025 COMPLETE CBC W/AUTO DIFF WBC: CPT

## 2022-10-01 PROCEDURE — 36415 COLL VENOUS BLD VENIPUNCTURE: CPT

## 2022-10-03 ENCOUNTER — ANESTHESIA EVENT (OUTPATIENT)
Dept: CARDIOLOGY | Facility: HOSPITAL | Age: 82
End: 2022-10-03
Payer: MEDICARE

## 2022-10-03 ENCOUNTER — ANESTHESIA EVENT (OUTPATIENT)
Dept: CARDIOLOGY | Facility: HOSPITAL | Age: 82
End: 2022-10-03

## 2022-10-03 RX ORDER — SODIUM CHLORIDE 9 MG/ML
9 INJECTION, SOLUTION INTRAVENOUS CONTINUOUS PRN
Status: CANCELLED | OUTPATIENT
Start: 2022-10-03

## 2022-10-03 RX ORDER — SODIUM CHLORIDE 0.9 % (FLUSH) 0.9 %
10 SYRINGE (ML) INJECTION EVERY 12 HOURS SCHEDULED
Status: CANCELLED | OUTPATIENT
Start: 2022-10-03

## 2022-10-03 RX ORDER — SODIUM CHLORIDE 0.9 % (FLUSH) 0.9 %
10 SYRINGE (ML) INJECTION AS NEEDED
Status: CANCELLED | OUTPATIENT
Start: 2022-10-03

## 2022-10-04 ENCOUNTER — HOSPITAL ENCOUNTER (OUTPATIENT)
Dept: CARDIOLOGY | Facility: HOSPITAL | Age: 82
Discharge: HOME | End: 2022-10-04
Payer: MEDICARE

## 2022-10-04 ENCOUNTER — APPOINTMENT (OUTPATIENT)
Dept: CARDIOLOGY | Facility: HOSPITAL | Age: 82
End: 2022-10-04
Payer: MEDICARE

## 2022-10-04 ENCOUNTER — HOSPITAL ENCOUNTER (OUTPATIENT)
Facility: HOSPITAL | Age: 82
Discharge: HOME | End: 2022-10-06
Attending: INTERNAL MEDICINE | Admitting: INTERNAL MEDICINE
Payer: MEDICARE

## 2022-10-04 ENCOUNTER — ANESTHESIA (OUTPATIENT)
Dept: CARDIOLOGY | Facility: HOSPITAL | Age: 82
End: 2022-10-04

## 2022-10-04 ENCOUNTER — ANESTHESIA (OUTPATIENT)
Dept: CARDIOLOGY | Facility: HOSPITAL | Age: 82
End: 2022-10-04
Payer: MEDICARE

## 2022-10-04 VITALS — SYSTOLIC BLOOD PRESSURE: 94 MMHG | OXYGEN SATURATION: 100 % | DIASTOLIC BLOOD PRESSURE: 41 MMHG

## 2022-10-04 DIAGNOSIS — I48.0 PAROXYSMAL ATRIAL FIBRILLATION: ICD-10-CM

## 2022-10-04 DIAGNOSIS — R00.2 PALPITATIONS: ICD-10-CM

## 2022-10-04 DIAGNOSIS — R06.09 DYSPNEA ON EXERTION: ICD-10-CM

## 2022-10-04 DIAGNOSIS — I10 PRIMARY HYPERTENSION: Chronic | ICD-10-CM

## 2022-10-04 DIAGNOSIS — I72.4 FEMORAL ARTERY PSEUDO-ANEURYSM, RIGHT: Primary | ICD-10-CM

## 2022-10-04 LAB
ACT BLD: 109 SECONDS (ref 89–137)
ACT BLD: 219 SECONDS (ref 89–137)
ACT BLD: 265 SECONDS (ref 89–137)
ACT BLD: 277 SECONDS (ref 89–137)
ACT BLD: 306 SECONDS (ref 89–137)
BH CV ECHO MEAS - EF(MOD-BP): 60 %
BH CV GROIN HEMATOMA RIGHT TRANS LENGTH: 2.9 CM
BH CV GROIN HEMATOMA RIGHT TRANS WIDTH: 2.1 CM
BH CV RIGHT GROIN PSA PROCEDURE SCRIPTING LRR: 1
BH CV XLRA MEAS EXT ILIAC A PSV RIGHT: 114 CM/SEC
MAXIMAL PREDICTED HEART RATE: 138 BPM
MAXIMAL PREDICTED HEART RATE: 138 BPM
PROX PFA PSV RIGHT: 183 CM/SEC
RIGHT GROIN CFA SYS: 78.9 CM/SEC
STRESS TARGET HR: 117 BPM
STRESS TARGET HR: 117 BPM

## 2022-10-04 PROCEDURE — 93312 ECHO TRANSESOPHAGEAL: CPT

## 2022-10-04 PROCEDURE — C1759 CATH, INTRA ECHOCARDIOGRAPHY: HCPCS | Performed by: INTERNAL MEDICINE

## 2022-10-04 PROCEDURE — 25010000002 MORPHINE PER 10 MG: Performed by: INTERNAL MEDICINE

## 2022-10-04 PROCEDURE — A9270 NON-COVERED ITEM OR SERVICE: HCPCS | Performed by: STUDENT IN AN ORGANIZED HEALTH CARE EDUCATION/TRAINING PROGRAM

## 2022-10-04 PROCEDURE — 93320 DOPPLER ECHO COMPLETE: CPT

## 2022-10-04 PROCEDURE — 93325 DOPPLER ECHO COLOR FLOW MAPG: CPT | Performed by: INTERNAL MEDICINE

## 2022-10-04 PROCEDURE — 25010000002 PROPOFOL 10 MG/ML EMULSION: Performed by: ANESTHESIOLOGY

## 2022-10-04 PROCEDURE — 25010000002 PROTAMINE SULFATE PER 10 MG: Performed by: ANESTHESIOLOGY

## 2022-10-04 PROCEDURE — 93312 ECHO TRANSESOPHAGEAL: CPT | Performed by: INTERNAL MEDICINE

## 2022-10-04 PROCEDURE — G0378 HOSPITAL OBSERVATION PER HR: HCPCS

## 2022-10-04 PROCEDURE — 93657 TX L/R ATRIAL FIB ADDL: CPT | Performed by: INTERNAL MEDICINE

## 2022-10-04 PROCEDURE — 93656 COMPRE EP EVAL ABLTJ ATR FIB: CPT | Performed by: INTERNAL MEDICINE

## 2022-10-04 PROCEDURE — 93325 DOPPLER ECHO COLOR FLOW MAPG: CPT

## 2022-10-04 PROCEDURE — 25010000002 HEPARIN (PORCINE) PER 1000 UNITS: Performed by: ANESTHESIOLOGY

## 2022-10-04 PROCEDURE — 93655 ICAR CATH ABLTJ DSCRT ARRHYT: CPT | Performed by: INTERNAL MEDICINE

## 2022-10-04 PROCEDURE — 25010000002 FENTANYL CITRATE (PF) 100 MCG/2ML SOLUTION: Performed by: ANESTHESIOLOGY

## 2022-10-04 PROCEDURE — C1732 CATH, EP, DIAG/ABL, 3D/VECT: HCPCS | Performed by: INTERNAL MEDICINE

## 2022-10-04 PROCEDURE — 63710000001 HYDROCODONE-ACETAMINOPHEN 5-325 MG TABLET: Performed by: STUDENT IN AN ORGANIZED HEALTH CARE EDUCATION/TRAINING PROGRAM

## 2022-10-04 PROCEDURE — C1894 INTRO/SHEATH, NON-LASER: HCPCS | Performed by: INTERNAL MEDICINE

## 2022-10-04 PROCEDURE — 25010000002 MORPHINE PER 10 MG: Performed by: STUDENT IN AN ORGANIZED HEALTH CARE EDUCATION/TRAINING PROGRAM

## 2022-10-04 PROCEDURE — 93926 LOWER EXTREMITY STUDY: CPT

## 2022-10-04 PROCEDURE — 25010000002 PROPOFOL 200 MG/20ML EMULSION: Performed by: ANESTHESIOLOGY

## 2022-10-04 PROCEDURE — C1730 CATH, EP, 19 OR FEW ELECT: HCPCS | Performed by: INTERNAL MEDICINE

## 2022-10-04 PROCEDURE — 85347 COAGULATION TIME ACTIVATED: CPT

## 2022-10-04 PROCEDURE — 93320 DOPPLER ECHO COMPLETE: CPT | Performed by: INTERNAL MEDICINE

## 2022-10-04 PROCEDURE — 63710000001 AMLODIPINE 5 MG TABLET: Performed by: STUDENT IN AN ORGANIZED HEALTH CARE EDUCATION/TRAINING PROGRAM

## 2022-10-04 PROCEDURE — 63710000001 LISINOPRIL 20 MG TABLET: Performed by: STUDENT IN AN ORGANIZED HEALTH CARE EDUCATION/TRAINING PROGRAM

## 2022-10-04 PROCEDURE — C1893 INTRO/SHEATH, FIXED,NON-PEEL: HCPCS | Performed by: INTERNAL MEDICINE

## 2022-10-04 RX ORDER — MEPERIDINE HYDROCHLORIDE 25 MG/ML
12.5 INJECTION INTRAMUSCULAR; INTRAVENOUS; SUBCUTANEOUS
Status: CANCELLED | OUTPATIENT
Start: 2022-10-04 | End: 2022-10-05

## 2022-10-04 RX ORDER — LABETALOL HYDROCHLORIDE 5 MG/ML
5 INJECTION, SOLUTION INTRAVENOUS
Status: CANCELLED | OUTPATIENT
Start: 2022-10-04

## 2022-10-04 RX ORDER — ONDANSETRON 2 MG/ML
4 INJECTION INTRAMUSCULAR; INTRAVENOUS EVERY 6 HOURS PRN
Status: DISCONTINUED | OUTPATIENT
Start: 2022-10-04 | End: 2022-10-06 | Stop reason: HOSPADM

## 2022-10-04 RX ORDER — SODIUM CHLORIDE 9 MG/ML
30 INJECTION, SOLUTION INTRAVENOUS CONTINUOUS
Status: DISCONTINUED | OUTPATIENT
Start: 2022-10-04 | End: 2022-10-04

## 2022-10-04 RX ORDER — HEPARIN SODIUM 1000 [USP'U]/ML
INJECTION, SOLUTION INTRAVENOUS; SUBCUTANEOUS AS NEEDED
Status: DISCONTINUED | OUTPATIENT
Start: 2022-10-04 | End: 2022-10-04 | Stop reason: SURG

## 2022-10-04 RX ORDER — SODIUM CHLORIDE 0.9 % (FLUSH) 0.9 %
3 SYRINGE (ML) INJECTION EVERY 12 HOURS SCHEDULED
Status: DISCONTINUED | OUTPATIENT
Start: 2022-10-04 | End: 2022-10-04

## 2022-10-04 RX ORDER — LISINOPRIL 20 MG/1
20 TABLET ORAL NIGHTLY
COMMUNITY

## 2022-10-04 RX ORDER — PANTOPRAZOLE SODIUM 40 MG/10ML
INJECTION, POWDER, LYOPHILIZED, FOR SOLUTION INTRAVENOUS AS NEEDED
Status: DISCONTINUED | OUTPATIENT
Start: 2022-10-04 | End: 2022-10-04 | Stop reason: SURG

## 2022-10-04 RX ORDER — DIPHENHYDRAMINE HYDROCHLORIDE 50 MG/ML
12.5 INJECTION INTRAMUSCULAR; INTRAVENOUS
Status: CANCELLED | OUTPATIENT
Start: 2022-10-04

## 2022-10-04 RX ORDER — FENTANYL CITRATE 50 UG/ML
INJECTION, SOLUTION INTRAMUSCULAR; INTRAVENOUS AS NEEDED
Status: DISCONTINUED | OUTPATIENT
Start: 2022-10-04 | End: 2022-10-04 | Stop reason: SURG

## 2022-10-04 RX ORDER — LISINOPRIL 20 MG/1
20 TABLET ORAL NIGHTLY
Status: DISCONTINUED | OUTPATIENT
Start: 2022-10-04 | End: 2022-10-06 | Stop reason: HOSPADM

## 2022-10-04 RX ORDER — PROPOFOL 10 MG/ML
INJECTION, EMULSION INTRAVENOUS AS NEEDED
Status: DISCONTINUED | OUTPATIENT
Start: 2022-10-04 | End: 2022-10-04 | Stop reason: SURG

## 2022-10-04 RX ORDER — ACETAMINOPHEN 650 MG/1
650 SUPPOSITORY RECTAL EVERY 4 HOURS PRN
Status: DISCONTINUED | OUTPATIENT
Start: 2022-10-04 | End: 2022-10-04

## 2022-10-04 RX ORDER — PRAVASTATIN SODIUM 20 MG
20 TABLET ORAL NIGHTLY
COMMUNITY

## 2022-10-04 RX ORDER — ASPIRIN 81 MG/1
81 TABLET ORAL NIGHTLY
Status: ON HOLD | COMMUNITY
End: 2022-10-04 | Stop reason: SDUPTHER

## 2022-10-04 RX ORDER — AMLODIPINE BESYLATE 5 MG/1
5 TABLET ORAL NIGHTLY
COMMUNITY

## 2022-10-04 RX ORDER — ACETAMINOPHEN 325 MG/1
650 TABLET ORAL EVERY 4 HOURS PRN
Status: DISCONTINUED | OUTPATIENT
Start: 2022-10-04 | End: 2022-10-04

## 2022-10-04 RX ORDER — SODIUM CHLORIDE 0.9 % (FLUSH) 0.9 %
3-10 SYRINGE (ML) INJECTION AS NEEDED
Status: DISCONTINUED | OUTPATIENT
Start: 2022-10-04 | End: 2022-10-04

## 2022-10-04 RX ORDER — PROTAMINE SULFATE 10 MG/ML
INJECTION, SOLUTION INTRAVENOUS AS NEEDED
Status: DISCONTINUED | OUTPATIENT
Start: 2022-10-04 | End: 2022-10-04 | Stop reason: SURG

## 2022-10-04 RX ORDER — HYDROCODONE BITARTRATE AND ACETAMINOPHEN 5; 325 MG/1; MG/1
1 TABLET ORAL EVERY 4 HOURS PRN
Status: DISCONTINUED | OUTPATIENT
Start: 2022-10-04 | End: 2022-10-05

## 2022-10-04 RX ORDER — ONDANSETRON 2 MG/ML
4 INJECTION INTRAMUSCULAR; INTRAVENOUS ONCE AS NEEDED
Status: CANCELLED | OUTPATIENT
Start: 2022-10-04

## 2022-10-04 RX ORDER — HYDROCODONE BITARTRATE AND ACETAMINOPHEN 5; 325 MG/1; MG/1
1 TABLET ORAL EVERY 4 HOURS PRN
Status: DISCONTINUED | OUTPATIENT
Start: 2022-10-04 | End: 2022-10-06 | Stop reason: HOSPADM

## 2022-10-04 RX ORDER — ACETAMINOPHEN 325 MG/1
650 TABLET ORAL EVERY 4 HOURS PRN
Status: DISCONTINUED | OUTPATIENT
Start: 2022-10-04 | End: 2022-10-06 | Stop reason: HOSPADM

## 2022-10-04 RX ORDER — PHENYLEPHRINE HCL IN 0.9% NACL 1 MG/10 ML
SYRINGE (ML) INTRAVENOUS AS NEEDED
Status: DISCONTINUED | OUTPATIENT
Start: 2022-10-04 | End: 2022-10-04 | Stop reason: SURG

## 2022-10-04 RX ORDER — FAMOTIDINE 10 MG/ML
INJECTION, SOLUTION INTRAVENOUS AS NEEDED
Status: DISCONTINUED | OUTPATIENT
Start: 2022-10-04 | End: 2022-10-04 | Stop reason: SURG

## 2022-10-04 RX ORDER — NALOXONE HCL 0.4 MG/ML
0.4 VIAL (ML) INJECTION
Status: DISCONTINUED | OUTPATIENT
Start: 2022-10-04 | End: 2022-10-06 | Stop reason: HOSPADM

## 2022-10-04 RX ORDER — EPHEDRINE SULFATE 5 MG/ML
5 INJECTION INTRAVENOUS ONCE AS NEEDED
Status: CANCELLED | OUTPATIENT
Start: 2022-10-04

## 2022-10-04 RX ORDER — ASPIRIN 81 MG/1
81 TABLET ORAL NIGHTLY
Qty: 90 TABLET | Refills: 1 | Status: SHIPPED | OUTPATIENT
Start: 2022-10-20 | End: 2022-10-06 | Stop reason: HOSPADM

## 2022-10-04 RX ORDER — IPRATROPIUM BROMIDE AND ALBUTEROL SULFATE 2.5; .5 MG/3ML; MG/3ML
3 SOLUTION RESPIRATORY (INHALATION) ONCE AS NEEDED
Status: CANCELLED | OUTPATIENT
Start: 2022-10-04

## 2022-10-04 RX ORDER — AMLODIPINE BESYLATE 5 MG/1
5 TABLET ORAL NIGHTLY
Status: DISCONTINUED | OUTPATIENT
Start: 2022-10-04 | End: 2022-10-06 | Stop reason: HOSPADM

## 2022-10-04 RX ORDER — SODIUM CHLORIDE 9 MG/ML
30 INJECTION, SOLUTION INTRAVENOUS CONTINUOUS
Status: DISCONTINUED | OUTPATIENT
Start: 2022-10-04 | End: 2022-10-06 | Stop reason: HOSPADM

## 2022-10-04 RX ORDER — ACETAMINOPHEN 650 MG/1
650 SUPPOSITORY RECTAL EVERY 4 HOURS PRN
Status: DISCONTINUED | OUTPATIENT
Start: 2022-10-04 | End: 2022-10-06 | Stop reason: HOSPADM

## 2022-10-04 RX ORDER — LIDOCAINE HYDROCHLORIDE 20 MG/ML
INJECTION, SOLUTION INFILTRATION; PERINEURAL AS NEEDED
Status: DISCONTINUED | OUTPATIENT
Start: 2022-10-04 | End: 2022-10-04 | Stop reason: HOSPADM

## 2022-10-04 RX ADMIN — LISINOPRIL 20 MG: 20 TABLET ORAL at 22:42

## 2022-10-04 RX ADMIN — PROPOFOL 60 MG: 10 INJECTION, EMULSION INTRAVENOUS at 11:45

## 2022-10-04 RX ADMIN — HEPARIN SODIUM 3000 UNITS: 1000 INJECTION INTRAVENOUS; SUBCUTANEOUS at 13:22

## 2022-10-04 RX ADMIN — Medication 200 MCG: at 12:27

## 2022-10-04 RX ADMIN — PANTOPRAZOLE SODIUM 40 MG: 40 INJECTION, POWDER, FOR SOLUTION INTRAVENOUS at 13:20

## 2022-10-04 RX ADMIN — PROPOFOL 160 MG: 10 INJECTION, EMULSION INTRAVENOUS at 11:24

## 2022-10-04 RX ADMIN — Medication 200 MCG: at 12:52

## 2022-10-04 RX ADMIN — Medication 200 MCG: at 12:48

## 2022-10-04 RX ADMIN — Medication 200 MCG: at 12:34

## 2022-10-04 RX ADMIN — HEPARIN SODIUM 10000 UNITS: 1000 INJECTION INTRAVENOUS; SUBCUTANEOUS at 12:31

## 2022-10-04 RX ADMIN — HYDROCODONE BITARTRATE AND ACETAMINOPHEN 1 TABLET: 5; 325 TABLET ORAL at 19:05

## 2022-10-04 RX ADMIN — Medication 100 MCG: at 12:45

## 2022-10-04 RX ADMIN — PROTAMINE SULFATE 100 MG: 10 INJECTION, SOLUTION INTRAVENOUS at 13:46

## 2022-10-04 RX ADMIN — PROPOFOL INJECTABLE EMULSION 75 MCG/KG/MIN: 10 INJECTION, EMULSION INTRAVENOUS at 11:44

## 2022-10-04 RX ADMIN — SODIUM CHLORIDE: 9 INJECTION, SOLUTION INTRAVENOUS at 12:43

## 2022-10-04 RX ADMIN — AMLODIPINE BESYLATE 5 MG: 5 TABLET ORAL at 22:41

## 2022-10-04 RX ADMIN — FENTANYL CITRATE 50 MCG: 50 INJECTION, SOLUTION INTRAMUSCULAR; INTRAVENOUS at 11:44

## 2022-10-04 RX ADMIN — Medication 200 MCG: at 12:04

## 2022-10-04 RX ADMIN — HEPARIN SODIUM 3000 UNITS: 1000 INJECTION INTRAVENOUS; SUBCUTANEOUS at 13:04

## 2022-10-04 RX ADMIN — SODIUM CHLORIDE 30 ML/HR: 9 INJECTION, SOLUTION INTRAVENOUS at 09:35

## 2022-10-04 RX ADMIN — MORPHINE SULFATE 1 MG: 4 INJECTION, SOLUTION INTRAMUSCULAR; INTRAVENOUS at 21:08

## 2022-10-04 RX ADMIN — HEPARIN SODIUM 4000 UNITS: 1000 INJECTION INTRAVENOUS; SUBCUTANEOUS at 12:45

## 2022-10-04 RX ADMIN — Medication 100 MCG: at 12:03

## 2022-10-04 RX ADMIN — FAMOTIDINE 20 MG: 10 INJECTION INTRAVENOUS at 13:00

## 2022-10-04 RX ADMIN — Medication 200 MCG: at 12:17

## 2022-10-04 NOTE — Clinical Note
Transparent Dressing was used to secure the sheath post procedure.  Sheath Left Intact after the procedure.

## 2022-10-04 NOTE — Clinical Note
A 10 fr sheath was successfully inserted using micropuncture technique into the right femoral vein.

## 2022-10-04 NOTE — PROGRESS NOTES
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

## 2022-10-04 NOTE — NURSING NOTE
Remove femstop at 2100 and transfer patient to PCU bed per Dr. Nolan. Keep sandbag on during night.

## 2022-10-04 NOTE — Clinical Note
EP Catheter removed.    Area H Indication Text: Tumors in this location are included in Area H (eyelids, eyebrows, nose, lips, chin, ear, pre-auricular, post-auricular, temple, genitalia, hands, feet, ankles and areola).  Tissue conservation is critical in these anatomic locations.

## 2022-10-04 NOTE — ANESTHESIA PREPROCEDURE EVALUATION
Anesthesia Evaluation     Patient summary reviewed and Nursing notes reviewed   no history of anesthetic complications:  NPO Solid Status: > 8 hours  NPO Liquid Status: > 8 hours           Airway   Mallampati: II  TM distance: >3 FB  Neck ROM: full  No difficulty expected  Dental      Pulmonary - normal exam   (+) a smoker Former, shortness of breath,   Cardiovascular     Rhythm: irregular  Rate: normal    (+) hypertension, CAD, CABG >6 Months, dysrhythmias Atrial Fib, hyperlipidemia,       Neuro/Psych- negative ROS  GI/Hepatic/Renal/Endo    (+) obesity,  GERD,      Musculoskeletal     (+) neck pain,   Abdominal  - normal exam   Substance History - negative use     OB/GYN negative ob/gyn ROS         Other   arthritis,                      Anesthesia Plan    ASA 3     MAC     intravenous induction     Anesthetic plan, risks, benefits, and alternatives have been provided, discussed and informed consent has been obtained with: patient.        CODE STATUS:

## 2022-10-04 NOTE — Clinical Note
Trans-septal procedure in progress   Successful transseptal stick, sheath advanced across septum, needle removed.

## 2022-10-04 NOTE — CONSULTS
Name: Tin Byrne ADMIT: 10/4/2022   : 1940  PCP: Jose Angel Ramirez Jr., MD    MRN: [id number] LOS: 0 days   AGE/SEX: 81 y.o. male  ROOM: [address] [place] [address]      Patient Care Team:  Jose Angel Ramirez Jr., MD as PCP - General  No chief complaint on file.    CC: afib, hematoma following EP study/ablation    Subjective     Consults afib, hematoma following EP study/ablation  History of Present Illness   Patient is an 81-year-old male with history of hypertension, hyperlipidemia, CAD/CABG, and A. Fib who presented to the hospital today for CARLA, EP study and ablation as patient has been experiencing symptomatic atrial arrhythmias with recently normal stress test with no ischemia. He takes aspirin 81 mg daily as well as pravastatin.  Vascular surgery has been asked to see this patient for right groin swelling.  Patient began developing right groin swelling immediately following sheath removal this afternoon. Pseudoaneurysm study today shows right deep femoral artery with evidence of AV fistula ending in the common femoral vein as well as hematoma measuring 2.9 x 2.1 cm. No pseudoaneurysm noted.  This was a very technically difficult study due to post-op edema, positioning, and acoustic artifacts.  RN feels that right groin swelling has continued to increase since sheath pulled. He has easily palpable pedal pulses.  Denies any chest pain or shortness of breath.    Review of Systems   Constitutional: Negative for chills and fever.   Respiratory: Negative for cough and shortness of breath.    Cardiovascular: Negative for chest pain and leg swelling.   Gastrointestinal: Negative for abdominal distention and abdominal pain.   Skin: Negative for color change and wound.   Neurological: Negative for dizziness and light-headedness.   Psychiatric/Behavioral: Negative for agitation and confusion.     Past Medical History:   Diagnosis Date   • Arthritis    • Atrial fibrillation (HCC)    • Benign prostatic hypertrophy     • GERD (gastroesophageal reflux disease)    • History of colonic polyps    • Hyperlipidemia    • Hypertension    • Neck pain      Past Surgical History:   Procedure Laterality Date   • CORONARY ARTERY BYPASS GRAFT     • OTHER SURGICAL HISTORY      NASAL SEPTAL DEVIATION REPAIR   • PARATHYROID GLAND SURGERY     • TONSILLECTOMY       Family History   Problem Relation Age of Onset   • Coronary artery disease Other    • Cancer Mother      Social History     Tobacco Use   • Smoking status: Former Smoker     Packs/day: 0.25     Years: 10.00     Pack years: 2.50     Types: Cigars     Quit date: 1967     Years since quittin.7   • Smokeless tobacco: Never Used   Vaping Use   • Vaping Use: Never used   Substance Use Topics   • Alcohol use: Yes     Alcohol/week: 1.0 standard drink     Types: 1 Glasses of wine per week   • Drug use: Not Currently     Medications Prior to Admission   Medication Sig Dispense Refill Last Dose   • amLODIPine (NORVASC) 5 MG tablet Take 5 mg by mouth Every Night.   10/3/2022 at Unknown time   • lisinopril (PRINIVIL,ZESTRIL) 20 MG tablet Take 20 mg by mouth Every Night.   10/3/2022 at Unknown time   • pravastatin (PRAVACHOL) 20 MG tablet Take 20 mg by mouth Every Night.   10/3/2022 at Unknown time   • [DISCONTINUED] aspirin 81 MG EC tablet Take 81 mg by mouth Every Night.   10/3/2022 at Unknown time     sodium chloride, 3 mL, Intravenous, Q12H      sodium chloride, 30 mL/hr, Last Rate: 30 mL/hr (10/04/22 1243)      sodium chloride  Patient has no known allergies.    Objective  Resting in bed in OPCV, no acute distress    Physical Exam:  Physical Exam  Constitutional:       Appearance: Normal appearance.      Comments: Patient shivering during exam   Eyes:      Pupils: Pupils are equal, round, and reactive to light.   Cardiovascular:      Pulses:           Radial pulses are 2+ on the right side and 2+ on the left side.        Dorsalis pedis pulses are 2+ on the right side and 2+ on the left  "side.        Posterior tibial pulses are 2+ on the right side and 2+ on the left side.   Pulmonary:      Effort: Pulmonary effort is normal.   Abdominal:      General: Abdomen is flat.      Palpations: Abdomen is soft.   Skin:     General: Skin is warm and dry.      Capillary Refill: Capillary refill takes less than 2 seconds.      Comments: Right groin hematoma noted. No bruising or discoloration   Neurological:      General: No focal deficit present.      Mental Status: He is alert and oriented to person, place, and time.   Psychiatric:         Mood and Affect: Mood normal.         Behavior: Behavior normal.      Vital Signs and Labs:  Vital Signs Patient Vitals for the past 24 hrs:   BP Temp Temp src Pulse Resp SpO2 Height Weight   10/04/22 0933 133/65 98.1 °F (36.7 °C) Oral 65 22 100 % 177.2 cm (69.75\") 103 kg (226 lb 3.1 oz)     I/O:  No intake/output data recorded.    CBC    Results from last 7 days   Lab Units 10/01/22  1052   WBC 10*3/mm3 6.08   HEMOGLOBIN g/dL 15.9   PLATELETS 10*3/mm3 151     BMP   Results from last 7 days   Lab Units 10/01/22  1052   SODIUM mmol/L 138   POTASSIUM mmol/L 4.5   CHLORIDE mmol/L 102   CO2 mmol/L 25.0   BUN mg/dL 18   CREATININE mg/dL 0.98   GLUCOSE mg/dL 97   MAGNESIUM mg/dL 2.3     Radiology(recent) No radiology results for the last day    Active Hospital Problems    Diagnosis  POA   • Palpitations [R00.2]  Unknown     Priority: Low   • Dyspnea on exertion [R06.09]  Unknown     Priority: Low   • Paroxysmal atrial fibrillation (HCC) [I48.0]  Unknown     Priority: Low      Resolved Hospital Problems   No resolved problems to display.     @[email or web address]@  [id number]    Assessment & Plan       Paroxysmal atrial fibrillation (HCC)    Palpitations    Dyspnea on exertion      81 y.o. male with history of HTN, HLD, CAD/CABG, and A. Fib who presented to the hospital today for CARLA, EP study and ablation as patient has been experiencing symptomatic atrial arrhythmias with recently normal " stress test with no ischemia. He takes aspirin 81 mg daily as well as pravastatin.  Vascular surgery has been asked to see this patient for right groin swelling which began immediately following sheath removal this afternoon. Pseudoaneurysm study today shows right deep femoral artery with evidence of AV fistula ending in the common femoral vein or iliac vein as well as hematoma measuring 2.9 x 2.1 cm. No pseudoaneurysm noted.  This was a very technically difficult study due to post-op edema, positioning, and acoustic artifacts.  RN feels that right groin swelling has continued to increase since sheath pulled. He has easily palpable pedal pulses and no motor or sensory loss or discoloration.  Denies any chest pain or shortness of breath.    At this time we are recommending cardiology order a FemoStop and nursing should continue to monitor groin site as well as peripheral pulses.  Patient will likely need CT scan for further evaluation as pseudoaneurysm study was very technically difficult, but we will likely plan for this tomorrow once patient has stabilized.    I discussed the patients findings and my recommendations with patient, family and nursing staff.    Juju Alcaraz, CHERRI  10/04/22  15:54 EDT    Please call my office with any question: [phone]

## 2022-10-04 NOTE — Clinical Note
Allergies reviewed.  H&P note has been confirmed for the patient. Procedural consent has been signed.  Staff has reviewed the patient's labs.  Labs have been reviewed and show abnormalities. Physician is aware of labs.

## 2022-10-04 NOTE — NURSING NOTE
1441: An called to assess right groin.  1442: An at bedside, he palpated and auscultated right groin. He noted no hematoma but gave verbal order for vascular doppler.  1500: Vascular at bedside obtaining images.  1506: Vascular still at bedside but RN called Cath Lab C to notified An of R groin AV fistula and small hematoma. No new orders at this time.  1510: Cath Lab C called back to give verbal order to consult vascular surgery.  1520: Vascular surgery consulted (waiting on hold for 5+ minutes to get through to office).  1530: Juju vascular NP at bedside to assess patient  1555: Instructed to place femstop by NICKO Matute, but to receive specific orders from Dr. Nolan.  1600: Femstop placed at minimum pressure of 30mmHg until An gives further others. Right dorsalis pedis is doppler, right posterior tibial +2. No numbness or tingling noted. Good cap refill.  1630: An at bedside and assessed site with RN. Femstop repositioned. Orders given to increase pressure to 60-75mmHg for 2 hours and then to decrease to 30mmHg for additional 3 hours (5 hours total). Once femstop is removed and patient stable, patient can be transferred to PCU room.

## 2022-10-05 ENCOUNTER — APPOINTMENT (OUTPATIENT)
Dept: CT IMAGING | Facility: HOSPITAL | Age: 82
End: 2022-10-05
Payer: MEDICARE

## 2022-10-05 ENCOUNTER — ANESTHESIA (OUTPATIENT)
Dept: PERIOP | Facility: HOSPITAL | Age: 82
End: 2022-10-05
Payer: MEDICARE

## 2022-10-05 ENCOUNTER — ANESTHESIA EVENT (OUTPATIENT)
Dept: PERIOP | Facility: HOSPITAL | Age: 82
End: 2022-10-05
Payer: MEDICARE

## 2022-10-05 LAB
ABO GROUP BLD: NORMAL
BLD GP AB SCN SERPL QL: NEGATIVE
DEPRECATED RDW RBC AUTO: 46.8 FL (ref 37–54)
ERYTHROCYTE [DISTWIDTH] IN BLOOD BY AUTOMATED COUNT: 14.7 % (ref 12.3–15.4)
HCT VFR BLD AUTO: 41.1 % (ref 37.5–51)
HGB BLD-MCNC: 13.5 G/DL (ref 13–17.7)
HOLD SPECIMEN: NORMAL
MCH RBC QN AUTO: 29.9 PG (ref 26.6–33)
MCHC RBC AUTO-ENTMCNC: 32.8 G/DL (ref 31.5–35.7)
MCV RBC AUTO: 91.3 FL (ref 79–97)
PLATELET # BLD AUTO: 120 10*3/MM3 (ref 140–450)
PMV BLD AUTO: 9.7 FL (ref 6–12)
RBC # BLD AUTO: 4.51 10*6/MM3 (ref 4.14–5.8)
RH BLD: POSITIVE
T&S EXPIRATION DATE: NORMAL
WBC NRBC COR # BLD: 10.6 10*3/MM3 (ref 3.4–10.8)

## 2022-10-05 PROCEDURE — A9270 NON-COVERED ITEM OR SERVICE: HCPCS | Performed by: INTERNAL MEDICINE

## 2022-10-05 PROCEDURE — 25010000002 ONDANSETRON PER 1 MG: Performed by: ANESTHESIOLOGY

## 2022-10-05 PROCEDURE — 25010000002 HEPARIN (PORCINE) PER 1000 UNITS: Performed by: ANESTHESIOLOGY

## 2022-10-05 PROCEDURE — 63710000001 LISINOPRIL 20 MG TABLET: Performed by: STUDENT IN AN ORGANIZED HEALTH CARE EDUCATION/TRAINING PROGRAM

## 2022-10-05 PROCEDURE — 25010000002 PROTAMINE SULFATE PER 10 MG: Performed by: ANESTHESIOLOGY

## 2022-10-05 PROCEDURE — 86900 BLOOD TYPING SEROLOGIC ABO: CPT

## 2022-10-05 PROCEDURE — 25010000002 CEFAZOLIN PER 500 MG: Performed by: STUDENT IN AN ORGANIZED HEALTH CARE EDUCATION/TRAINING PROGRAM

## 2022-10-05 PROCEDURE — 25010000002 PHENYLEPHRINE 10 MG/ML SOLUTION: Performed by: ANESTHESIOLOGY

## 2022-10-05 PROCEDURE — 25010000002 PROPOFOL 10 MG/ML EMULSION: Performed by: ANESTHESIOLOGY

## 2022-10-05 PROCEDURE — 63710000001 AMLODIPINE 5 MG TABLET: Performed by: STUDENT IN AN ORGANIZED HEALTH CARE EDUCATION/TRAINING PROGRAM

## 2022-10-05 PROCEDURE — 25010000002 PHENYLEPHRINE 10 MG/ML SOLUTION 5 ML VIAL: Performed by: ANESTHESIOLOGY

## 2022-10-05 PROCEDURE — 25010000002 HEPARIN (PORCINE) PER 1000 UNITS: Performed by: STUDENT IN AN ORGANIZED HEALTH CARE EDUCATION/TRAINING PROGRAM

## 2022-10-05 PROCEDURE — 63710000001 HYDROCODONE-ACETAMINOPHEN 5-325 MG TABLET: Performed by: INTERNAL MEDICINE

## 2022-10-05 PROCEDURE — A9270 NON-COVERED ITEM OR SERVICE: HCPCS | Performed by: STUDENT IN AN ORGANIZED HEALTH CARE EDUCATION/TRAINING PROGRAM

## 2022-10-05 PROCEDURE — 25010000002 FENTANYL CITRATE (PF) 100 MCG/2ML SOLUTION: Performed by: ANESTHESIOLOGY

## 2022-10-05 PROCEDURE — 0 IOPAMIDOL PER 1 ML: Performed by: INTERNAL MEDICINE

## 2022-10-05 PROCEDURE — 93005 ELECTROCARDIOGRAM TRACING: CPT | Performed by: INTERNAL MEDICINE

## 2022-10-05 PROCEDURE — 86901 BLOOD TYPING SEROLOGIC RH(D): CPT | Performed by: SURGERY

## 2022-10-05 PROCEDURE — 86850 RBC ANTIBODY SCREEN: CPT | Performed by: SURGERY

## 2022-10-05 PROCEDURE — G0378 HOSPITAL OBSERVATION PER HR: HCPCS

## 2022-10-05 PROCEDURE — 86900 BLOOD TYPING SEROLOGIC ABO: CPT | Performed by: SURGERY

## 2022-10-05 PROCEDURE — 86901 BLOOD TYPING SEROLOGIC RH(D): CPT

## 2022-10-05 PROCEDURE — 0 LIDOCAINE 1 % SOLUTION: Performed by: STUDENT IN AN ORGANIZED HEALTH CARE EDUCATION/TRAINING PROGRAM

## 2022-10-05 PROCEDURE — 74174 CTA ABD&PLVS W/CONTRAST: CPT

## 2022-10-05 PROCEDURE — 25010000002 CEFAZOLIN PER 500 MG: Performed by: ANESTHESIOLOGY

## 2022-10-05 PROCEDURE — 85027 COMPLETE CBC AUTOMATED: CPT | Performed by: INTERNAL MEDICINE

## 2022-10-05 PROCEDURE — 0 LIDOCAINE 1 % SOLUTION: Performed by: ANESTHESIOLOGY

## 2022-10-05 PROCEDURE — 93010 ELECTROCARDIOGRAM REPORT: CPT | Performed by: INTERNAL MEDICINE

## 2022-10-05 DEVICE — LIGACLIP MCA MULTIPLE CLIP APPLIERS, 20 SMALL CLIPS
Type: IMPLANTABLE DEVICE | Site: GROIN | Status: FUNCTIONAL
Brand: LIGACLIP

## 2022-10-05 DEVICE — LIGACLIP MCA MULTIPLE CLIP APPLIERS, 20 MEDIUM CLIPS
Type: IMPLANTABLE DEVICE | Site: GROIN | Status: FUNCTIONAL
Brand: LIGACLIP

## 2022-10-05 RX ORDER — LIDOCAINE HYDROCHLORIDE 10 MG/ML
INJECTION, SOLUTION INFILTRATION; PERINEURAL AS NEEDED
Status: DISCONTINUED | OUTPATIENT
Start: 2022-10-05 | End: 2022-10-05 | Stop reason: SURG

## 2022-10-05 RX ORDER — ROCURONIUM BROMIDE 10 MG/ML
INJECTION, SOLUTION INTRAVENOUS AS NEEDED
Status: DISCONTINUED | OUTPATIENT
Start: 2022-10-05 | End: 2022-10-05 | Stop reason: SURG

## 2022-10-05 RX ORDER — PHENYLEPHRINE HYDROCHLORIDE 10 MG/ML
INJECTION INTRAVENOUS AS NEEDED
Status: DISCONTINUED | OUTPATIENT
Start: 2022-10-05 | End: 2022-10-05 | Stop reason: SURG

## 2022-10-05 RX ORDER — PROPOFOL 10 MG/ML
VIAL (ML) INTRAVENOUS AS NEEDED
Status: DISCONTINUED | OUTPATIENT
Start: 2022-10-05 | End: 2022-10-05 | Stop reason: SURG

## 2022-10-05 RX ORDER — SODIUM CHLORIDE 9 MG/ML
INJECTION, SOLUTION INTRAVENOUS CONTINUOUS PRN
Status: DISCONTINUED | OUTPATIENT
Start: 2022-10-05 | End: 2022-10-05 | Stop reason: SURG

## 2022-10-05 RX ORDER — NEOSTIGMINE METHYLSULFATE 5 MG/5 ML
SYRINGE (ML) INTRAVENOUS AS NEEDED
Status: DISCONTINUED | OUTPATIENT
Start: 2022-10-05 | End: 2022-10-05 | Stop reason: SURG

## 2022-10-05 RX ORDER — ONDANSETRON 2 MG/ML
INJECTION INTRAMUSCULAR; INTRAVENOUS AS NEEDED
Status: DISCONTINUED | OUTPATIENT
Start: 2022-10-05 | End: 2022-10-05 | Stop reason: SURG

## 2022-10-05 RX ORDER — HEPARIN SODIUM 1000 [USP'U]/ML
INJECTION, SOLUTION INTRAVENOUS; SUBCUTANEOUS AS NEEDED
Status: DISCONTINUED | OUTPATIENT
Start: 2022-10-05 | End: 2022-10-05 | Stop reason: SURG

## 2022-10-05 RX ORDER — HYDROMORPHONE HCL 110MG/55ML
0.5 PATIENT CONTROLLED ANALGESIA SYRINGE INTRAVENOUS
Status: CANCELLED | OUTPATIENT
Start: 2022-10-05 | End: 2022-10-12

## 2022-10-05 RX ORDER — OXYCODONE HYDROCHLORIDE 5 MG/1
5 TABLET ORAL EVERY 4 HOURS PRN
Status: CANCELLED | OUTPATIENT
Start: 2022-10-05 | End: 2022-10-12

## 2022-10-05 RX ORDER — CEFAZOLIN SODIUM 1 G/3ML
INJECTION, POWDER, FOR SOLUTION INTRAMUSCULAR; INTRAVENOUS AS NEEDED
Status: DISCONTINUED | OUTPATIENT
Start: 2022-10-05 | End: 2022-10-05 | Stop reason: SURG

## 2022-10-05 RX ORDER — FENTANYL CITRATE 50 UG/ML
INJECTION, SOLUTION INTRAMUSCULAR; INTRAVENOUS AS NEEDED
Status: DISCONTINUED | OUTPATIENT
Start: 2022-10-05 | End: 2022-10-05 | Stop reason: SURG

## 2022-10-05 RX ORDER — LIDOCAINE HYDROCHLORIDE 10 MG/ML
INJECTION, SOLUTION INFILTRATION; PERINEURAL AS NEEDED
Status: DISCONTINUED | OUTPATIENT
Start: 2022-10-05 | End: 2022-10-05 | Stop reason: HOSPADM

## 2022-10-05 RX ORDER — GLYCOPYRROLATE 0.2 MG/ML
INJECTION INTRAMUSCULAR; INTRAVENOUS AS NEEDED
Status: DISCONTINUED | OUTPATIENT
Start: 2022-10-05 | End: 2022-10-05 | Stop reason: SURG

## 2022-10-05 RX ORDER — PROTAMINE SULFATE 10 MG/ML
INJECTION, SOLUTION INTRAVENOUS AS NEEDED
Status: DISCONTINUED | OUTPATIENT
Start: 2022-10-05 | End: 2022-10-05 | Stop reason: SURG

## 2022-10-05 RX ORDER — NALOXONE HCL 0.4 MG/ML
0.4 VIAL (ML) INJECTION
Status: CANCELLED | OUTPATIENT
Start: 2022-10-05

## 2022-10-05 RX ADMIN — LISINOPRIL 20 MG: 20 TABLET ORAL at 20:24

## 2022-10-05 RX ADMIN — PROTAMINE SULFATE 30 MG: 10 INJECTION, SOLUTION INTRAVENOUS at 23:18

## 2022-10-05 RX ADMIN — PROPOFOL 150 MG: 10 INJECTION, EMULSION INTRAVENOUS at 21:55

## 2022-10-05 RX ADMIN — AMLODIPINE BESYLATE 5 MG: 5 TABLET ORAL at 20:25

## 2022-10-05 RX ADMIN — SODIUM CHLORIDE: 0.9 INJECTION, SOLUTION INTRAVENOUS at 21:53

## 2022-10-05 RX ADMIN — HEPARIN SODIUM 8000 UNITS: 1000 INJECTION INTRAVENOUS; SUBCUTANEOUS at 22:41

## 2022-10-05 RX ADMIN — HYDROCODONE BITARTRATE AND ACETAMINOPHEN 1 TABLET: 5; 325 TABLET ORAL at 01:43

## 2022-10-05 RX ADMIN — SODIUM CHLORIDE: 0.9 INJECTION, SOLUTION INTRAVENOUS at 23:18

## 2022-10-05 RX ADMIN — PHENYLEPHRINE HYDROCHLORIDE 200 MCG: 10 INJECTION INTRAVENOUS at 22:08

## 2022-10-05 RX ADMIN — FENTANYL CITRATE 100 MCG: 50 INJECTION, SOLUTION INTRAMUSCULAR; INTRAVENOUS at 23:33

## 2022-10-05 RX ADMIN — PHENYLEPHRINE HYDROCHLORIDE 200 MCG: 10 INJECTION INTRAVENOUS at 21:55

## 2022-10-05 RX ADMIN — FENTANYL CITRATE 100 MCG: 50 INJECTION, SOLUTION INTRAMUSCULAR; INTRAVENOUS at 22:24

## 2022-10-05 RX ADMIN — LIDOCAINE HYDROCHLORIDE 50 MG: 10 INJECTION, SOLUTION INFILTRATION; PERINEURAL at 21:55

## 2022-10-05 RX ADMIN — PHENYLEPHRINE HYDROCHLORIDE 200 MCG: 10 INJECTION INTRAVENOUS at 22:01

## 2022-10-05 RX ADMIN — ROCURONIUM BROMIDE 10 MG: 10 INJECTION, SOLUTION INTRAVENOUS at 23:04

## 2022-10-05 RX ADMIN — ROCURONIUM BROMIDE 50 MG: 10 INJECTION, SOLUTION INTRAVENOUS at 21:55

## 2022-10-05 RX ADMIN — ONDANSETRON 4 MG: 2 INJECTION INTRAMUSCULAR; INTRAVENOUS at 23:18

## 2022-10-05 RX ADMIN — SODIUM CHLORIDE 30 ML/HR: 9 INJECTION, SOLUTION INTRAVENOUS at 02:00

## 2022-10-05 RX ADMIN — CEFAZOLIN SODIUM 2 G: 1 INJECTION, POWDER, FOR SOLUTION INTRAMUSCULAR; INTRAVENOUS at 22:05

## 2022-10-05 RX ADMIN — PHENYLEPHRINE HYDROCHLORIDE 100 MCG: 10 INJECTION INTRAVENOUS at 22:50

## 2022-10-05 RX ADMIN — GLYCOPYRROLATE 0.2 MG: 0.2 INJECTION INTRAMUSCULAR; INTRAVENOUS at 21:55

## 2022-10-05 RX ADMIN — Medication 4 MG: at 23:35

## 2022-10-05 RX ADMIN — PHENYLEPHRINE HYDROCHLORIDE 0.25 MCG/KG/MIN: 10 INJECTION INTRAVENOUS at 22:16

## 2022-10-05 RX ADMIN — IOPAMIDOL 100 ML: 755 INJECTION, SOLUTION INTRAVENOUS at 10:20

## 2022-10-05 RX ADMIN — GLYCOPYRROLATE 0.4 MG: 0.2 INJECTION INTRAMUSCULAR; INTRAVENOUS at 23:35

## 2022-10-05 RX ADMIN — CEFAZOLIN 2 G: 2 INJECTION, POWDER, FOR SOLUTION INTRAMUSCULAR; INTRAVENOUS at 19:21

## 2022-10-05 NOTE — ANESTHESIA PREPROCEDURE EVALUATION
Anesthesia Evaluation     Patient summary reviewed and Nursing notes reviewed   no history of anesthetic complications:  NPO Solid Status: > 8 hours  NPO Liquid Status: > 8 hours           Airway   Mallampati: II  TM distance: >3 FB  Neck ROM: full  No difficulty expected  Dental      Pulmonary - normal exam   (+) a smoker Former, shortness of breath,   Cardiovascular     ECG reviewed  Rhythm: irregular  Rate: normal    (+) hypertension, CAD, CABG >6 Months, dysrhythmias Atrial Fib, hyperlipidemia,     ROS comment: Interpretation Summary    · Findings consistent with a normal ECG stress test.  · Left ventricular ejection fraction is normal. (Calculated EF = 60%).  · Myocardial perfusion imaging indicates a normal myocardial perfusion study with no evidence of ischemia.  · Clinical correlation is recommended         Neuro/Psych- negative ROS  GI/Hepatic/Renal/Endo    (+) obesity,  GERD,      Musculoskeletal     (+) neck pain,   Abdominal  - normal exam   Substance History - negative use     OB/GYN negative ob/gyn ROS         Other   arthritis,      ROS/Med Hx Other: Previous EP case 10/04/2022                  Anesthesia Plan    ASA 3 - emergent     general     (Patient identified; pre-operative vital signs, all relevant labs/studies, complete medical/surgical/anesthetic history, full medication list, full allergy list, and NPO status obtained/reviewed; physical assessment performed; anesthetic options, side effects, potential complications, risks, and benefits discussed; questions answered; written anesthesia consent obtained; patient cleared for procedure; anesthesia machine and equipment checked and functioning)  intravenous induction           CODE STATUS:

## 2022-10-05 NOTE — PROGRESS NOTES
Mehrdad Mcarthur MD    Location: [facility]     LOS: 0 days   Patient Care Team:  Jose Angel Ramirez Jr., MD as PCP - General        Subjective     81 y.o. male with soft stable right groin hematoma this morning on exam.  I cannot hear an audible bruit with my stethoscope on exam right groin which would not be consistent with right groin femoral arteriovenous fistula present.  Discussed with patient and nurse present at time of visit this morning above findings and concerns.  Will order CT angiogram abdomen pelvis this morning to assess right groin for possible femoral arteriovenous fistula.  Unsure if present it had been there after previous cardiac ablation procedures.  Patient states has had no issues with his prior ablation procedures.  Patient has been hemodynamically stable through the night and hemoglobin stable this morning.  Patient is strong right posterior tibial pedal pulse with no leg swelling right calf or ankle.  We will keep n.p.o. except for medications in case fistula present for possible surgery later today or possibly tomorrow depending upon timing and findings.  Patient aware of plans and agrees.    Patient with no chest pain or shortness of breath through the night.  Heart rate controlled.  Patient with hypertension hyperlipidemia coronary artery disease status post open heart surgery bypass and atrial fibrillation with previous ablation procedures.  Patient has been on aspirin and statin.    Review of Systems  Review of Systems - Negative except history of present illness      Objective     Vital Signs  Temp:  [97.3 °F (36.3 °C)-98.1 °F (36.7 °C)] 97.3 °F (36.3 °C)  Heart Rate:  [] 74  Resp:  [14-22] 14  BP: ()/() 131/58    Physical Exam  General: No acute distress. Alert and oriented x 4  HEENT: No jugular venous distension, trachea is midline  CV: controlled heart rate  Resp: Clear unlabored breathing  Abd: Abdomen is soft, nontender, nondistended  Extremities: Right groin  soft stable hematoma, palpable right posterior tibial pulse, no ankle swelling, no bruit present right groin    Results Review:       Recent Results (from the past 12 hour(s))   CBC (No Diff)    Collection Time: 10/05/22  4:12 AM    Specimen: Blood   Result Value Ref Range    WBC 10.60 3.40 - 10.80 10*3/mm3    RBC 4.51 4.14 - 5.80 10*6/mm3    Hemoglobin 13.5 13.0 - 17.7 g/dL    Hematocrit 41.1 37.5 - 51.0 %    MCV 91.3 79.0 - 97.0 fL    MCH 29.9 26.6 - 33.0 pg    MCHC 32.8 31.5 - 35.7 g/dL    RDW 14.7 12.3 - 15.4 %    RDW-SD 46.8 37.0 - 54.0 fl    MPV 9.7 6.0 - 12.0 fL    Platelets 120 (L) 140 - 450 10*3/mm3   Green Top (Gel)    Collection Time: 10/05/22  4:12 AM   Result Value Ref Range    Extra Tube Hold for add-ons.    ECG 12 Lead    Collection Time: 10/05/22  5:47 AM   Result Value Ref Range    QT Interval 395 ms   ]      Assessment & Plan             Paroxysmal atrial fibrillation (HCC)    Palpitations    Dyspnea on exertion      Assessment & Plan  81 y.o. male for CT angiogram abdomen and pelvis to assess for possible right femoral arteriovenous fistula after heart catheterization.  NPO except for medications until results known.      Mehrdad Mcarthur MD  10/05/22  07:24 EDT

## 2022-10-05 NOTE — PLAN OF CARE
Goal Outcome Evaluation:  Plan of Care Reviewed With: patient        Progress: improving  Outcome Evaluation: Pt's has been resting on and off through the shift. He had one complaint of back pain that was relieved with prn pain medication.

## 2022-10-05 NOTE — CASE MANAGEMENT/SOCIAL WORK
Discharge Planning Assessment   [facility]     Patient Name: Tin Byrne  MRN: [id number]  Today's Date: 10/5/2022    Admit Date: 10/4/2022    Plan: Anticipate home with family.   Discharge Needs Assessment     Row Name 10/05/22 0959       Living Environment    People in Home spouse    Current Living Arrangements home    Primary Care Provided by self    Provides Primary Care For no one    Family Caregiver if Needed child(moy), adult    Quality of Family Relationships supportive    Able to Return to Prior Arrangements yes       Resource/Environmental Concerns    Resource/Environmental Concerns none    Transportation Concerns none       Transition Planning    Patient/Family Anticipates Transition to home with family    Patient/Family Anticipated Services at Transition none    Transportation Anticipated family or friend will provide       Discharge Needs Assessment    Readmission Within the Last 30 Days no previous admission in last 30 days    Equipment Currently Used at Home none    Concerns to be Addressed no discharge needs identified    Anticipated Changes Related to Illness none    Equipment Needed After Discharge none               Discharge Plan     Row Name 10/05/22 1000       Plan    Plan Anticipate home with family.    Patient/Family in Agreement with Plan yes    Plan Comments Met with pt at bedside, he lives at home with his spouse, and his daughter is visiting from [facility].  Pt is IADLs, and drives.  Denies need of DME or HH services.  PCP is Dr. Ramirez until end of year, and then patient will be seeing Dr. Renner in Februrary 2023.  Pharmacy verified.  Pt denies any financial barriers with his home medications or food.  Pt daughter to provide transportation at d/c.  CAR reviewed/discussed with pt, signed copy obtained, and copy provided to pt.  Pending CT Abd/Pelvis, IVF.              Continued Care and Services - Admitted Since 10/4/2022           Expected Discharge Date and Time      Expected Discharge Date Expected Discharge Time    Oct 6, 2022  8:00 PM         Demographic Summary     Row Name 10/05/22 0959       General Information    Admission Type observation    Arrived From operating room               Functional Status     Row Name 10/05/22 0959       Functional Status    Usual Activity Tolerance excellent    Current Activity Tolerance good       Functional Status, IADL    Medications independent    Meal Preparation independent    Housekeeping independent    Laundry independent    Shopping independent       Mental Status    General Appearance WDL WDL       Mental Status Summary    Recent Changes in Mental Status/Cognitive Functioning no changes                      Patient Forms     Row Name 10/05/22 0958       Patient Forms    Important Message from Medicare ([facility]) --  CAR given 10/5/22.    Patient Observation Letter Delivered    Delivered to Patient    Method of delivery In person                  Rach Escobar RN

## 2022-10-05 NOTE — PLAN OF CARE
Goal Outcome Evaluation:              Outcome Evaluation: patient has been on full bed rest today able to set up 30 dregrees. Had CT groin. results reported to DR. Mcarthur, and Dr. Nolan. poss surgery to repair the  fistula.

## 2022-10-06 ENCOUNTER — READMISSION MANAGEMENT (OUTPATIENT)
Dept: CALL CENTER | Facility: HOSPITAL | Age: 82
End: 2022-10-06

## 2022-10-06 VITALS
BODY MASS INDEX: 33.49 KG/M2 | HEART RATE: 109 BPM | WEIGHT: 233.91 LBS | TEMPERATURE: 98.1 F | HEIGHT: 70 IN | DIASTOLIC BLOOD PRESSURE: 56 MMHG | SYSTOLIC BLOOD PRESSURE: 113 MMHG | OXYGEN SATURATION: 99 % | RESPIRATION RATE: 20 BRPM

## 2022-10-06 LAB
ANION GAP SERPL CALCULATED.3IONS-SCNC: 9 MMOL/L (ref 5–15)
BUN SERPL-MCNC: 14 MG/DL (ref 8–23)
BUN/CREAT SERPL: 14.6 (ref 7–25)
CALCIUM SPEC-SCNC: 8.1 MG/DL (ref 8.6–10.5)
CHLORIDE SERPL-SCNC: 103 MMOL/L (ref 98–107)
CO2 SERPL-SCNC: 25 MMOL/L (ref 22–29)
CREAT SERPL-MCNC: 0.96 MG/DL (ref 0.76–1.27)
DEPRECATED RDW RBC AUTO: 48.1 FL (ref 37–54)
EGFRCR SERPLBLD CKD-EPI 2021: 78.9 ML/MIN/1.73
ERYTHROCYTE [DISTWIDTH] IN BLOOD BY AUTOMATED COUNT: 14.9 % (ref 12.3–15.4)
GLUCOSE SERPL-MCNC: 150 MG/DL (ref 65–99)
HCT VFR BLD AUTO: 35.9 % (ref 37.5–51)
HGB BLD-MCNC: 11.9 G/DL (ref 13–17.7)
MCH RBC QN AUTO: 30 PG (ref 26.6–33)
MCHC RBC AUTO-ENTMCNC: 33.1 G/DL (ref 31.5–35.7)
MCV RBC AUTO: 90.8 FL (ref 79–97)
PLATELET # BLD AUTO: 109 10*3/MM3 (ref 140–450)
PMV BLD AUTO: 10 FL (ref 6–12)
POTASSIUM SERPL-SCNC: 4 MMOL/L (ref 3.5–5.2)
RBC # BLD AUTO: 3.95 10*6/MM3 (ref 4.14–5.8)
SODIUM SERPL-SCNC: 137 MMOL/L (ref 136–145)
WBC NRBC COR # BLD: 10.2 10*3/MM3 (ref 3.4–10.8)

## 2022-10-06 PROCEDURE — 85027 COMPLETE CBC AUTOMATED: CPT | Performed by: STUDENT IN AN ORGANIZED HEALTH CARE EDUCATION/TRAINING PROGRAM

## 2022-10-06 PROCEDURE — 63710000001 HYDROCODONE-ACETAMINOPHEN 5-325 MG TABLET: Performed by: STUDENT IN AN ORGANIZED HEALTH CARE EDUCATION/TRAINING PROGRAM

## 2022-10-06 PROCEDURE — 80048 BASIC METABOLIC PNL TOTAL CA: CPT | Performed by: STUDENT IN AN ORGANIZED HEALTH CARE EDUCATION/TRAINING PROGRAM

## 2022-10-06 PROCEDURE — 25010000002 HYDROMORPHONE 1 MG/ML SOLUTION: Performed by: ANESTHESIOLOGY

## 2022-10-06 PROCEDURE — 99217 PR OBSERVATION CARE DISCHARGE MANAGEMENT: CPT | Performed by: INTERNAL MEDICINE

## 2022-10-06 PROCEDURE — A9270 NON-COVERED ITEM OR SERVICE: HCPCS | Performed by: STUDENT IN AN ORGANIZED HEALTH CARE EDUCATION/TRAINING PROGRAM

## 2022-10-06 PROCEDURE — G0378 HOSPITAL OBSERVATION PER HR: HCPCS

## 2022-10-06 PROCEDURE — 25010000002 CEFAZOLIN PER 500 MG: Performed by: STUDENT IN AN ORGANIZED HEALTH CARE EDUCATION/TRAINING PROGRAM

## 2022-10-06 RX ORDER — HYDROCODONE BITARTRATE AND ACETAMINOPHEN 5; 325 MG/1; MG/1
1 TABLET ORAL EVERY 6 HOURS PRN
Qty: 25 TABLET | Refills: 0 | Status: SHIPPED | OUTPATIENT
Start: 2022-10-06 | End: 2022-10-31

## 2022-10-06 RX ADMIN — HYDROMORPHONE HYDROCHLORIDE 0.5 MG: 1 INJECTION, SOLUTION INTRAMUSCULAR; INTRAVENOUS; SUBCUTANEOUS at 00:25

## 2022-10-06 RX ADMIN — CEFAZOLIN 2 G: 2 INJECTION, POWDER, FOR SOLUTION INTRAMUSCULAR; INTRAVENOUS at 11:20

## 2022-10-06 RX ADMIN — HYDROCODONE BITARTRATE AND ACETAMINOPHEN 1 TABLET: 5; 325 TABLET ORAL at 11:20

## 2022-10-06 RX ADMIN — CEFAZOLIN 2 G: 2 INJECTION, POWDER, FOR SOLUTION INTRAMUSCULAR; INTRAVENOUS at 03:03

## 2022-10-06 NOTE — OP NOTE
Date of Admission:  10/4/2022  Today's Date:  10/06/22  Dwight Osborne II, MD  [facility]    Preoperative Diagnosis:   Right femoral pseudoaneurysm  Right femoral arteriovenous fistula    Postoperative Diagnosis:   Same    Procedure Performed:   1) right femoral pseudoaneurysm repair  2) evacuation of hematoma     CPT:    84617 - Direct repair of aneurysm, pseudoaneurysm, or excision [partial or total] and graft insertion, with or without patch graft; for aneurysm, pseudoaneurysm, and associated occlusive disease, common femoral artery [profunda femoris, superficial femoral]  00808 - drainage of hematoma      Surgeon:   Dwight Osborne II, MD    Assistant:    None    Anesthesia:   General    Estimated Blood Loss:   100-250 cc    Findings:    Right groin hematoma with clotted blood that was evacuated upon entering the groin.  Patient's superficial femoral artery had arteriotomies on the anterior and posterior aspect and femoral vein had venotomy on anterior aspect just deep to the femoral venotomy consistent with CT showing AV fistula in this area.  All appeared acute.  All repaired with 5-0 Prolene sutures and were hemostatic at the conclusion of the procedure.  Patient had multiphasic Doppler signals in right foot at conclusion of procedure.    Implants:    Implant Name Type Inv. Item Serial No.  Lot No. LRB No. Used Action   CLIPAPPLR M/ ENDO LIGACLIP 9 3/8IN SM - [id number] Implant CLIPAPPLR M/ ENDO LIGACLIP 9 3/8IN   [employer]  DIV OF J [employer] J [id number] Right 1 Implanted   CLIPAPPLR M/ ENDO LIGACLIP 20CLP 11IN MD - [id number] Implant CLIPAPPLR M/ ENDO LIGACLIP 20CLP 11IN MD  [employer]  DIV OF J AND J [id number] Right 1 Implanted       Staff:   Circulator: Luciana Queen, RN    Specimen:   none    Complications:   none    Dispo:   to PACU    Indication for procedure:   81 y.o. male with heart arrhythmia who is required multiple cardiac ablations.  He had a cardiac ablation recently that  was complicated by postop hematoma and was found to have a pseudoaneurysm with a arteriovenous fistula in the area of his access on duplex and then CTA as well.  The patient was evaluated today and plans were made for a surgical repair of his pseudoaneurysm and traumatic arteriovenous fistula.  Risk benefits and alternatives were discussed with the patient and his family and informed consent was obtained.  All questions answered prior to the procedure.    Description of procedure:   Patient taken to the operating room placed supine on the operating table.  General endotracheal anesthesia was begun by the anesthesia service and see their notes for details of their care once patient was appropriately anesthetized his right groin was prepped with chlorhexidine and then he was draped in standard sterile fashion.  A surgical timeout was performed prior to beginning the procedure.  Began the procedure by making a longitudinal arteriotomy over the right groin.  We then used combination of electrocautery and Metzenbaum scissors to dissect down to the level of the femoral artery.  However we did encounter a large hematoma, larger than expected based on preoperative imaging.  This is all clotted blood and it was evacuated and passed off the field.  We did have to irrigate the surgical field to help clear this old blood and provide better visualization.  Once this was done we were able to use Metzenbaum scissors to dissect down onto the common femoral artery as it came under the inguinal ligament.  We then dissected this circumferentially and using a right angle passed a vessel loop placed around this.  We then carried our dissection distally down onto the superficial femoral artery.  We did encounter the anterior arteriotomy that was the cause of the pseudoaneurysm seen on the CT scan.  This was able to be controlled with manual pressure while we completed the dissection of the superficial femoral artery.  The superficial  femoral artery was dissected circumferentially and Vesseloops were passed around it as well.  At this point i was able to place a 5-0 Prolene suture through the anterior arteriotomy to provide hemostasis there.  Then in our process of continuing to circumferentially  dissect this area of the femoral artery we did encounter the posterior arteriotomy and the anterior venotomy which is presumably the site of the arteriovenous fistula.  We were able to control bleeding with manual pressure and placed angled DeBakey clamps on either side of the femoral arteriotomy to get control this bleeding.  I was then able to place a 5-0 Prolene suture through the venotomy and this was hemostatic.  I then turned my attention back to the artery and a 5-0 Prolene suture was placed through the posterior arteriotomy to ensure hemostasis there.  On releasing our clamps however it did appear there was more bleeding than I was comfortable with from the anterior arteriotomy that I had previously put a stitch in and so the artery in this area was debrided sharply with scissors and then a another Prolene suture was used to repair the artery more sufficiently.  The artery and vein were then examined extensively and were not found to be made narrow.  A Doppler was obtained and there were appropriate Doppler signals distal to the artery repair and the patient had pedal signals in his right foot.  We then irrigated the surgical field with warm normal saline and hemostasis was obtained using electrocautery.  Platelet rich plasma was irrigated into the field.  The wound was then closed in layers with 2-0 Vicryl suture followed by 3-0 Vicryl suture followed by 4-0 Vicryl suture through the skin.  The incision was dressed with skin glue.  Patient was then aroused from anesthesia and transported to PACU in good condition.  He tolerated the procedure well there were no intraoperative complications and all sponge instrument and needle counts were correct  at the conclusion of the procedure.  The patient signals in his feet were checked a second time before leaving the operating room and were found to be multiphasic in the DP and PT.  The patient's wife has been called and updated on the completion of the procedure.    Dwight Osborne II, MD  10/06/22     Active Hospital Problems    Diagnosis  POA   • Palpitations [R00.2]  Unknown   • Dyspnea on exertion [R06.09]  Unknown   • Paroxysmal atrial fibrillation (HCC) [I48.0]  Unknown      Resolved Hospital Problems   No resolved problems to display.              Bladder non-tender and non-distended. Urine clear yellow.

## 2022-10-06 NOTE — ANESTHESIA PROCEDURE NOTES
Airway  Urgency: elective    Date/Time: 10/5/2022 9:57 PM  End Time:10/5/2022 9:57 PM  Airway not difficult    General Information and Staff    Patient location during procedure: OR  Anesthesiologist: Ehsan Alvarez MD    Indications and Patient Condition  Indications for airway management: airway protection    Preoxygenated: yes  MILS maintained throughout  Mask difficulty assessment: 1 - vent by mask    Final Airway Details  Final airway type: endotracheal airway      Successful airway: ETT  Cuffed: yes   Successful intubation technique: direct laryngoscopy  Endotracheal tube insertion site: oral  Blade: Ursula  Blade size: 4  ETT size (mm): 7.0  Cormack-Lehane Classification: grade I - full view of glottis  Placement verified by: chest auscultation and capnometry   Measured from: teeth  ETT/EBT  to teeth (cm): 22  Number of attempts at approach: 1  Assessment: lips, teeth, and gum same as pre-op and atraumatic intubation    Additional Comments  X1 UDVC. WITH EASE. ATRAUMATIC.  TEETH IN PREOP CONDITION.  CUFF TO MINIMUM OCCLUSIVE CUFF PRESSURE. POS ETCO2. BS=BS. GAUZE BITE BLOCK

## 2022-10-06 NOTE — DISCHARGE SUMMARY
Admitting diagnosis:  Recurrent atrial fibrillation and atrial flutter and failed medical treatment  Coronary artery disease with prior bypass surgery and left atrial appendage ligation  Essential hypertension  Hyperlipidemia  Severe left atrial enlargement     Discharging diagnosis:  Post AF ablation  Post ablation AV fistula and pseudoaneurysm needing open repair  Above diagnosis     Consultants:  Vascular surgery     Procedures done:  CARLA and AF ablation  Open repair of AV fistula and pseudoaneurysm     Condition at discharge:  Fair    Physical Exam  VITALS REVIEWED  Blood pressure 153/76 pulse rate is 78 patient is afebrile respiration 12 times a minute  Right groin with hematoma and incision site is clean without any discharge  General:      Well-developed, well-nourished, in no acute distress.    Head:      normocephalic and atraumatic.    Eyes:      PERRL/EOM intact, conjunctivae and sclerae clear without nystagmus.    Neck:      no masses, thyromegaly,  trachea central with normal respiratory effort   Lungs:      clear bilaterally to auscultation.    Heart:      Sinus rhythm without any murmurs or gallops  Msk:      no deformity or scoliosis noted of thoracic or lumbar spine.    Pulses:      pulses normal in all 4 extremities.    Extremities:       no cyanosis or clubbing  Neurologic:      no focal deficits. Alert and oriented x3  Skin:      intact without lesions or rashes.    Psych:      alert and cooperative; normal mood and affect; normal attention span and concentration.       Hospital course--patient had a prior AF ablation few years ago.  Started have recurrent atrial arrhythmia refractory to medical treatment.  Patient brought in for EP study and CARLA with ablation.  CARLA revealed left atrial appendage ligation and severe left atrial enlargement with normal EF.  Patient underwent AF ablation to sinus rhythm.  Post ablation he developed right groin hematoma.  An ultrasound was ordered revealing a AV  fistula and following days a CT revealed a small pseudoaneurysm and AV fistula.  Vascular surgery evaluated the patient and underwent open repair for the AV fistula and pseudoaneurysm.  Patient was doing hemodynamically well on the day of discharge.  Patient was having significant pain and discomfort in the groin area.  Lortab was given for pain relief and after discussing options patient wanted to go home.  Patient will be given Xarelto for 2 weeks and later changed and aspirin.  Follow-up appointment is made in the office in a week.  Medications reviewed with this patient.  Patient to continue other home medications.  Patient is being discharged home     Medications and allergies reviewed     Post procedure instructions given     Discharge care discussed with the nurse and the patient     Post procedure appointments made      Electronically signed by Mark Nolan MD, 10/06/22, 12:35 PM EDT.

## 2022-10-06 NOTE — ANESTHESIA PROCEDURE NOTES
Peripheral IV    Patient location during procedure: OR  Start time: 10/5/2022 10:00 PM  End time: 10/5/2022 10:03 PM  Line placed for Fluids/Medication Admin.  Preanesthetic Checklist  Completed: patient identified, IV checked, site marked, risks and benefits discussed, surgical consent, monitors and equipment checked, pre-op evaluation and timeout performed  Peripheral IV Prep   Patient position: supine   Prep: ChloraPrep  Peripheral IV Procedure   Laterality:left  Location:  Wrist  Catheter size: 16 G         Post Assessment   Dressing Type: tape and transparent.    IV Dressing/Site: clean, dry and intact

## 2022-10-06 NOTE — PLAN OF CARE
Goal Outcome Evaluation:  Plan of Care Reviewed With: patient        Progress: improving  Outcome Evaluation: Pt has had no s/s of bleeding from his surgical site. He has had only one complaint of pain. He has been resting with his eyes closed for most of the shift after returing from surgery

## 2022-10-06 NOTE — OUTREACH NOTE
Prep Survey    Flowsheet Row Responses   [facility]od[facility] facility patient discharged from? [facility]   Is LACE score < 7 ? Yes   Emergency Room discharge w/ pulse ox? No   Eligibility [facility]   Hospital [facility]   Date of Admission 10/04/22   Date of Discharge 10/06/22   Discharge Disposition Home or Self Care   Discharge diagnosis AF ablation, open repair of AV fistula and pseudoaneurysm   Does the patient have one of the following disease processes/diagnoses(primary or secondary)? General Surgery   Does the patient have Home health ordered? No   Is there a DME ordered? No   Prep survey completed? Yes          RAI ZUÑIGA - Registered Nurse

## 2022-10-06 NOTE — CASE MANAGEMENT/SOCIAL WORK
Case Management Discharge Note      Final Note: Home with family         Selected Continued Care - Admitted Since 10/4/2022          Transportation Services  Private: Car    Final Discharge Disposition Code: 01 - home or self-care    Continued Stay Note   [facility]     Patient Name: Tin Byrne  MRN: [id number]  Today's Date: 10/6/2022    Admit Date: 10/4/2022    Plan: D/C Plan: Anticipate routine home with family. Xarelto copay $0 per [facility].   Discharge Plan     Row Name 10/06/22 1246       Plan    Plan D/C Plan: Anticipate routine home with family. Xarelto copay $0 per [facility].    Plan Comments CM contacted [facility] and verified patient has $0 copay for Xarelto.    Final Discharge Disposition Code 01 - home or self-care    Final Note Home with family    Row Name 10/06/22 1151       Plan    Plan D/C Plan: Anticipate routine home with family.    Plan Comments Barrier to D/C: anticipate d/c once cleared by cardiology.                    Expected Discharge Date and Time     Expected Discharge Date Expected Discharge Time    Oct 6, 2022              Phone communication or documentation only - no physical contact with patient or family.    JUNITO ThorpeN, RN    [address] [address]    Office: [phone]  Fax: [phone]

## 2022-10-06 NOTE — PROGRESS NOTES
Name: Tin Byrne ADMIT: 10/4/2022   : 1940  PCP: Jose Angel Ramirez Jr., MD    MRN: [id number] LOS: 0 days   AGE/SEX: 81 y.o. male  ROOM:     [facility]    [place], [facility]    No chief complaint on file.    CC: afib, hematoma following EP study/ablation    Subjective     81 y.o. male who presented for EP study and cardiac ablation and subsequently developed right groin hematoma and ultrasound was suggestive of AV fistula.  CTA was obtained yesterday and showed AV fistula and small pseudoaneurysm involving the proximal right superficial femoral artery and patient is now postop day 1 status post right femoral pseudoaneurysm and AV fistula repair.  He has done well postoperatively and has no complaints this morning.    Review of Systems   Constitutional: Negative for chills and fever.   Respiratory: Negative for cough and shortness of breath.    Cardiovascular: Negative for chest pain and palpitations.   Gastrointestinal: Negative for abdominal distention.   Musculoskeletal: Negative for arthralgias and myalgias.   Skin: Positive for wound.   Neurological: Negative for dizziness and light-headedness.   Psychiatric/Behavioral: Negative for agitation and confusion.     Objective  resting in bed, no acute distress, no family present    Scheduled Medications:   amLODIPine, 5 mg, Oral, Nightly  ceFAZolin, 2 g, Intravenous, Q8H  lisinopril, 20 mg, Oral, Nightly    Active Infusions:  sodium chloride, 30 mL/hr, Last Rate: 30 mL/hr (10/05/22 1822)    As Needed Medications:  •  acetaminophen **OR** acetaminophen  •  HYDROcodone-acetaminophen  •  Morphine **AND** naloxone  •  ondansetron  •  ondansetron    Vital Signs  Vital Signs Patient Vitals for the past 24 hrs:   BP Temp Temp src Pulse Resp SpO2 Weight   10/06/22 0548 108/42 97.8 °F (36.6 °C) Oral 78 18 95 % 106 kg (233 lb 14.5 oz)   10/06/22 0449 99/41 -- -- 75 -- 94 % --   10/06/22 0050 153/73 98.6 °F (37 °C) Axillary 79 19 92 % --    10/06/22 0036 141/63 97.9 °F (36.6 °C) Temporal 83 20 96 % --   10/06/22 0024 152/89 -- -- 79 -- 98 % --   10/06/22 0023 152/89 -- -- 78 17 97 % --   10/06/22 0015 152/51 -- -- 79 -- 94 % --   10/06/22 0008 148/48 -- -- 77 15 95 % --   10/06/22 0003 146/56 -- -- 75 18 94 % --   10/05/22 2358 149/48 -- -- 75 20 96 % --   10/05/22 2353 155/72 97.2 °F (36.2 °C) Temporal 72 12 96 % --   10/05/22 2024 158/74 98.8 °F (37.1 °C) Oral 75 18 -- --   10/05/22 1700 149/56 98.1 °F (36.7 °C) Oral 83 20 99 % --   10/05/22 1500 119/62 98.3 °F (36.8 °C) Oral 76 16 100 % --   10/05/22 1100 140/75 98 °F (36.7 °C) Oral 73 16 100 % --     I/O:  I/O last 3 completed shifts:  In: 2502 [P.O.:240; I.V.:2127; Blood:135]  Out: 200 [Urine:200]    Physical Exam:  Physical Exam  Constitutional:       Appearance: Normal appearance.   HENT:      Head: Normocephalic.   Eyes:      Pupils: Pupils are equal, round, and reactive to light.   Cardiovascular:      Rate and Rhythm: Normal rate.      Pulses:           Radial pulses are 2+ on the right side and 2+ on the left side.        Dorsalis pedis pulses are 2+ on the right side and 2+ on the left side.        Posterior tibial pulses are 2+ on the right side and 2+ on the left side.   Pulmonary:      Effort: Pulmonary effort is normal.   Abdominal:      General: Abdomen is flat.   Skin:     General: Skin is warm and dry.      Capillary Refill: Capillary refill takes less than 2 seconds.      Comments: Right groin incision appears clean, dry, intact with significant but stable bruising surrounding incision   Neurological:      General: No focal deficit present.      Mental Status: He is alert.   Psychiatric:         Mood and Affect: Mood normal.         Behavior: Behavior normal.      Results Review:   CBC    Results from last 7 days   Lab Units 10/06/22  0448 10/05/22  0412 10/01/22  1052   WBC 10*3/mm3 10.20 10.60 6.08   HEMOGLOBIN g/dL 11.9* 13.5 15.9   PLATELETS 10*3/mm3 109* 120* 151     BMP    Results from last 7 days   Lab Units 10/06/22  0448 10/01/22  1052   SODIUM mmol/L 137 138   POTASSIUM mmol/L 4.0 4.5   CHLORIDE mmol/L 103 102   CO2 mmol/L 25.0 25.0   BUN mg/dL 14 18   CREATININE mg/dL 0.96 0.98   GLUCOSE mg/dL 150* 97   MAGNESIUM mg/dL  --  2.3     Radiology(recent) CT Angiogram Abdomen Pelvis    Result Date: 10/5/2022   1. Arteriovenous fistula and small pseudoaneurysm involving the proximal right superficial femoral artery, at the same level. Please see above discussion for details. This is may well be amenable to endoluminal therapy, potentially with stent grafting. 2. There is no definitive fistulous communication identified originating from either the right common or profunda femoral arteries, or from the external iliac artery itself. 3. Sizable right groin hematoma as described above. There is no evidence of retroperitoneal or intraperitoneal extension of the hematoma. 4. Small unenhancing soft tissue attenuation structure, located anterior to the distal right external iliac vein. The precise etiology and significance of this finding is dubious and doubtful. 5. Numerous additional findings, both vascular and nonvascular, as described above.  Catheter directed angiographic evaluation is likely going to be necessary to confirm the aforementioned conclusions.  Electronically Signed By-Alejo Cortez MD On:10/5/2022 12:51 PM This report was finalized on [id number] by  Alejo Cortez MD.    Assessment & Plan      Assessment & Plan      Paroxysmal atrial fibrillation (HCC)    Palpitations    Dyspnea on exertion    81 y.o. male with history of HTN, HLD, CAD/CABG, and A. Fib who presented to the hospital on 10/4 for elective CARLA, EP study and ablation as patient had been experiencing symptomatic atrial arrhythmias with recently normal stress test with no ischemia. He takes aspirin 81 mg daily as well as pravastatin.    Patient developed right groin swelling immediately following sheath  removal. Pseudoaneurysm study showed right deep femoral artery with evidence of AV fistula ending in the common femoral vein or iliac vein as well as hematoma measuring 2.9 x 2.1 cm. No pseudoaneurysm noted.  CTA was obtained yesterday and showed AV fistula and small pseudoaneurysm involving the proximal right superficial femoral artery and patient is now postop day # 1 status post right femoral pseudoaneurysm and AV fistula repair.  He has done well postoperatively and has no complaints this morning.    Patient is okay to ambulate. He has palpable pedal pulse and right groin incision appears clean/dry/intact with stable bruising.  Hemoglobin stable at 11.9 today. He is okay to discharge home from vascular standpoint.     Juju Alcaraz, CHERRI  10/06/22  10:12 EDT    Please call my office with any question: [phone]    Active Hospital Problems    Diagnosis  POA   • Palpitations [R00.2]  Unknown     Priority: Low   • Dyspnea on exertion [R06.09]  Unknown     Priority: Low   • Paroxysmal atrial fibrillation (HCC) [I48.0]  Unknown     Priority: Low      Resolved Hospital Problems   No resolved problems to display.

## 2022-10-06 NOTE — ANESTHESIA POSTPROCEDURE EVALUATION
Patient: Tin Byrne    Procedure Summary     Date: 10/05/22 Room / Location: [facility] OR 06 / [facility] MAIN OR    Anesthesia Start: 2153 Anesthesia Stop: 2351    Procedure: PSEUDO ANEURYSM REPAIR, EXTREMITY (Right Femoral) Diagnosis:     Surgeons: Dwight Osborne II, MD Provider: Ehsan Alvarez MD    Anesthesia Type: general ASA Status: 3 - Emergent          Anesthesia Type: general    Vitals  Vitals Value Taken Time   /63 10/06/22 0036   Temp 97.9 °F (36.6 °C) 10/06/22 0036   Pulse 81 10/06/22 0037   Resp 20 10/06/22 0036   SpO2 91 % 10/06/22 0037   Vitals shown include unvalidated device data.        Post Anesthesia Care and Evaluation    Patient location during evaluation: PACU  Patient participation: complete - patient participated  Level of consciousness: awake  Pain scale: See nurse's notes for pain score.  Pain management: adequate    Airway patency: patent  Anesthetic complications: No anesthetic complications  PONV Status: none  Cardiovascular status: acceptable  Respiratory status: acceptable  Hydration status: acceptable    Comments: Patient seen and examined postoperatively; vital signs stable; SpO2 greater than or equal to 90%; cardiopulmonary status stable; nausea/vomiting adequately controlled; pain adequately controlled; no apparent anesthesia complications; patient discharged from anesthesia care when discharge criteria were met

## 2022-10-07 ENCOUNTER — TRANSITIONAL CARE MANAGEMENT TELEPHONE ENCOUNTER (OUTPATIENT)
Dept: CALL CENTER | Facility: HOSPITAL | Age: 82
End: 2022-10-07

## 2022-10-07 NOTE — OUTREACH NOTE
Call Center TCM Note    Flowsheet Row Responses   [facility], [place], operated by [facility] patient discharged from? [facility]   Does the patient have one of the following disease processes/diagnoses(primary or secondary)? General Surgery   TCM attempt successful? Yes   Call start time 1230   Call end time 1246   Discharge diagnosis AF ablation, open repair of AV fistula and pseudoaneurysm   Person spoke with today (if not patient) and relationship patient   Meds reviewed with patient/caregiver? Yes  [new: norco and Xarelto.    Stopped: aspirin]   Does the patient have all medications related to this admission filled (includes all antibiotics, pain medications, etc.) Yes  [Patient elects not to fill the norco. ]   Is the patient taking all medications as directed (includes completed medication regime)? Yes   Comments PCP Dr Ramirez. Patient declines to schedule PCP HFU with call today. He reports that he is going to be following up with Dr Nolan and with the vascular surgeon.   Has home health visited the patient within 72 hours of discharge? N/A   Psychosocial issues? No   Did the patient receive a copy of their discharge instructions? Yes   Nursing interventions Reviewed instructions with patient   What is the patient's perception of their health status since discharge? Improving   Nursing interventions Nurse provided patient education   Is the patient /caregiver able to teach back basic post-op care? Practice 'cough and deep breath', Keep incision areas clean,dry and protected   Is the patient/caregiver able to teach back signs and symptoms of incisional infection? Increased redness, swelling or pain at the incisonal site, Increased drainage or bleeding, Fever   Is the patient/caregiver able to teach back steps to recovery at home? Set small, achievable goals for return to baseline health, Rest and rebuild strength, gradually increase activity, Eat a well-balance diet   If the patient is a current smoker, are they able to teach back  resources for cessation? Not a smoker   Is the patient/caregiver able to teach back the hierarchy of who to call/visit for symptoms/problems? PCP, Specialist, Home health nurse, Urgent Care, ED, 911 Yes   TCM call completed? Yes          Chhaya Nava RN    10/7/2022, 12:47 EDT

## 2022-10-11 ENCOUNTER — OFFICE VISIT (OUTPATIENT)
Dept: FAMILY MEDICINE CLINIC | Facility: CLINIC | Age: 82
End: 2022-10-11

## 2022-10-11 VITALS
DIASTOLIC BLOOD PRESSURE: 82 MMHG | SYSTOLIC BLOOD PRESSURE: 135 MMHG | BODY MASS INDEX: 32.64 KG/M2 | OXYGEN SATURATION: 97 % | WEIGHT: 228 LBS | HEART RATE: 79 BPM | HEIGHT: 70 IN | TEMPERATURE: 97.1 F | RESPIRATION RATE: 16 BRPM

## 2022-10-11 DIAGNOSIS — I25.10 CORONARY ARTERY DISEASE INVOLVING NATIVE CORONARY ARTERY OF NATIVE HEART WITHOUT ANGINA PECTORIS: Chronic | ICD-10-CM

## 2022-10-11 DIAGNOSIS — I72.9 PSEUDOANEURYSM: Primary | ICD-10-CM

## 2022-10-11 DIAGNOSIS — I48.0 PAROXYSMAL ATRIAL FIBRILLATION: Chronic | ICD-10-CM

## 2022-10-11 PROBLEM — R06.09 DYSPNEA ON EXERTION: Status: RESOLVED | Noted: 2022-09-16 | Resolved: 2022-10-11

## 2022-10-11 PROCEDURE — 1111F DSCHRG MED/CURRENT MED MERGE: CPT | Performed by: FAMILY MEDICINE

## 2022-10-11 PROCEDURE — 99495 TRANSJ CARE MGMT MOD F2F 14D: CPT | Performed by: FAMILY MEDICINE

## 2022-10-11 NOTE — PROGRESS NOTES
"Subjective   Tin Byrne is a 81 y.o. male.     Chief Complaint   Patient presents with   • Palpitations     Hospital follow up   • Transitional Care Management       HPI  Chief complaint: Atrial fibrillation    The patient is a 81-year-old white male who was recently hospitalized for cardiac ablation for atrial fibrillation.  The ablation went well.  Post ablation however the patient developed a pseudoaneurysm of the right femoral artery.  Required surgical intervention.  The patient postoperatively did well.  He was able to be discharged after surgery.  Patient is currently on Xarelto 20 mg daily.    Patient states that since being at home he has been in sinus rhythm.  He denied chest pain shortness of breath orthopnea or PND.  The patient states that he does have some pain and swelling and bruising of the right lower extremity.        The following portions of the patient's history were reviewed and updated as appropriate: allergies, current medications, past family history, past medical history, past social history, past surgical history and problem list.    Review of Systems    Objective     /82 (BP Location: Right arm, Patient Position: Sitting, Cuff Size: Large Adult)   Pulse 79   Temp 97.1 °F (36.2 °C) (Infrared)   Resp 16   Ht 177.2 cm (69.75\")   Wt 103 kg (228 lb)   SpO2 97%   BMI 32.95 kg/m²     Physical Exam  Vitals and nursing note reviewed.   Constitutional:       Appearance: He is well-developed.   HENT:      Head: Normocephalic and atraumatic.   Eyes:      Pupils: Pupils are equal, round, and reactive to light.   Cardiovascular:      Rate and Rhythm: Normal rate and regular rhythm.      Pulses: Normal pulses.      Heart sounds: Normal heart sounds. No murmur heard.    No friction rub. No gallop.      Comments: Swelling of the RLE with ecchymosis from the groin to the ankle  Pulmonary:      Effort: Pulmonary effort is normal.      Breath sounds: Normal breath sounds.   Abdominal: "      General: Bowel sounds are normal.      Palpations: Abdomen is soft.   Musculoskeletal:         General: Normal range of motion.      Cervical back: Neck supple.   Skin:     General: Skin is warm and dry.   Neurological:      Mental Status: He is alert and oriented to person, place, and time.   Psychiatric:         Behavior: Behavior normal.         Thought Content: Thought content normal.         Judgment: Judgment normal.       CT Angiogram Abdomen Pelvis (10/05/2022 10:19)  ECG 12 Lead (10/05/2022 05:47)  Duplex Pseudoaneurysm CAR (10/04/2022 15:45)  Basic Metabolic Panel (10/06/2022 04:48)  CBC (No Diff) (10/06/2022 04:48)      Assessment & Plan   Diagnoses and all orders for this visit:    1. Pseudoaneurysm (HCC) (Primary)-the patient was advised that we will take some time for the bruising and the swelling of the right lower extremity to subside.    2. Paroxysmal atrial fibrillation (HCC)    3. Coronary artery disease involving native coronary artery of native heart without angina pectoris      Patient Instructions   Continue your current medications and treatment.    Follow up in the office in 3 months.    Laboratory testing at that time.      Jose Angel Ramirez Jr., MD    10/11/22

## 2022-10-12 LAB — QT INTERVAL: 395 MS

## 2022-10-13 ENCOUNTER — OFFICE VISIT (OUTPATIENT)
Dept: CARDIOLOGY | Facility: CLINIC | Age: 82
End: 2022-10-13

## 2022-10-13 VITALS
HEIGHT: 70 IN | BODY MASS INDEX: 32.64 KG/M2 | WEIGHT: 228 LBS | SYSTOLIC BLOOD PRESSURE: 146 MMHG | HEART RATE: 63 BPM | DIASTOLIC BLOOD PRESSURE: 89 MMHG | OXYGEN SATURATION: 100 %

## 2022-10-13 DIAGNOSIS — R06.09 DYSPNEA ON EXERTION: ICD-10-CM

## 2022-10-13 DIAGNOSIS — Z86.79 STATUS POST ABLATION OF ATRIAL FIBRILLATION: ICD-10-CM

## 2022-10-13 DIAGNOSIS — Z98.890 STATUS POST ABLATION OF ATRIAL FIBRILLATION: ICD-10-CM

## 2022-10-13 DIAGNOSIS — I48.19 PERSISTENT ATRIAL FIBRILLATION: ICD-10-CM

## 2022-10-13 DIAGNOSIS — I48.0 PAROXYSMAL ATRIAL FIBRILLATION: Primary | ICD-10-CM

## 2022-10-13 DIAGNOSIS — I10 PRIMARY HYPERTENSION: ICD-10-CM

## 2022-10-13 DIAGNOSIS — R00.2 PALPITATIONS: ICD-10-CM

## 2022-10-13 DIAGNOSIS — I77.0: ICD-10-CM

## 2022-10-13 DIAGNOSIS — I25.10 CORONARY ARTERY DISEASE INVOLVING NATIVE CORONARY ARTERY OF NATIVE HEART WITHOUT ANGINA PECTORIS: ICD-10-CM

## 2022-10-13 PROCEDURE — 99214 OFFICE O/P EST MOD 30 MIN: CPT | Performed by: INTERNAL MEDICINE

## 2022-10-13 PROCEDURE — 93000 ELECTROCARDIOGRAM COMPLETE: CPT | Performed by: INTERNAL MEDICINE

## 2022-10-13 NOTE — PROGRESS NOTES
CC--Atrial fibrillation, hypertension    SUB--81-year-old male patient extremely well-known to me started have recurrent atrial arrhythmias.  He had a recent stress test without ischemia.  A redo AF ablation was done and his original  AF ablation was done in 2017.  AF ablation was done last week unfortunately complicated by right groin hematoma with a small pseudoaneurysm and AV fistula needing open surgery.  He has significant right groin discomfort and denies any fever and chills and has noticed diffuse bruising and the discomfort in his right thigh is getting slowly better.  Patient has known history of hypertension coronary artery disease in the past.  He also has hyperlipidemia.        Past Medical History:     Reviewed history from 01/24/2017 and no changes required:        Hyperlipidemia        Hypertension        Obesity        Arthritis        Peripheral vascular disease - < 50% stenoses of the carotids        Atrial fibrillation-Radioablation 12/2016        Prostate biopsy - 2012 - benign        GERD        Coronary artery disease    Past Surgical History:     Reviewed history from 01/16/2017 and no changes required:        Nasal reconstruction        Prostate biopsy(2010)        Parathyroid adenma removed.         Cardioversion - 1/2012; 10/5/2016        CABG - 07/2014        A-flutter ablation 12/2016 [facility]          Physical Exam    General:      well developed, well nourished, in no acute distress.    Head:      normocephalic and atraumatic.    Eyes:      PERRL/EOM intact, conjunctivae and sclerae clear without nystagmus.    Neck:      no  thyromegaly, trachea central with normal respiratory effort  Lungs:      clear bilaterally to auscultation.    Heart:       regular rate and rhythm, S1, S2 without murmurs, rubs, or gallops  Skin:      intact without lesions or rashes.    Psych:      alert and cooperative; normal mood and affect; normal attention span and concentration.      The groin incision appears  clean except for the inferior aspect minimal redness is noted.  Underlying hematoma is noted.  Diffuse bruising noted without any redness in the remaining part of the thigh.  There is no discharge.        ASSESSMENT PLAN    Post redo AF ablation--recurrent flutter noted today  AF ablation complicated by small pseudoaneurysm and AV fistula needing surgery  Surgical site appears fairly clean except for minimal redness on the inferior aspect and bacitracin was applied and packets of bacitracin given to the patient to be applied twice a day for next 5 to 7 days.  Appointment with vascular surgery for incision check made.  Check CBC in 2 weeks.  Continue Xarelto for 4 more weeks  Samples of Xarelto were given to the patient.  Coronary artery disease stable without any angina  Hyper  lipidemia treated and followed by primary care physician  Hypertension controlled and treated  Medications reviewed  Outcome of AF ablation educated  ELLIOT ligated  Presence of a PFO      ECG 12 Lead    Date/Time: 10/13/2022 12:29 PM  Performed by: Mark Nolan MD  Authorized by: Mark Nolan MD   Comparison: compared with previous ECG   Comparison to previous ECG: Current EKG shows atrial flutter with controlled ventricular rate compared to previous sinus rhythm with PVCs              Electronically signed by Mark Nolan MD, 10/13/22, 12:29 PM EDT.

## 2022-10-19 ENCOUNTER — APPOINTMENT (OUTPATIENT)
Dept: VASCULAR SURGERY | Facility: HOSPITAL | Age: 82
End: 2022-10-19

## 2022-10-24 ENCOUNTER — APPOINTMENT (OUTPATIENT)
Dept: VASCULAR SURGERY | Facility: HOSPITAL | Age: 82
End: 2022-10-24

## 2022-10-24 ENCOUNTER — TELEPHONE (OUTPATIENT)
Dept: CARDIOLOGY | Facility: CLINIC | Age: 82
End: 2022-10-24

## 2022-10-24 PROCEDURE — G0463 HOSPITAL OUTPT CLINIC VISIT: HCPCS

## 2022-10-24 NOTE — TELEPHONE ENCOUNTER
Caller: Tin Byrne    Relationship: Self    Best call back number: [phone]    What is the best time to reach you: ANYTIME    Who are you requesting to speak with (clinical staff, provider,  specific staff member): ANYONE        What was the call regarding: PT HAS APPT 10/31/2022, DOES HE NEED TO COME IN FOR THAT APPT? PT THOUGHT APPT SHOULD BE MIDDLE OF November. DOES HE NEED BLOOD WORK DONE PRIOR TO APPT?          Do you require a callback: YES

## 2022-10-27 ENCOUNTER — LAB (OUTPATIENT)
Dept: LAB | Facility: HOSPITAL | Age: 82
End: 2022-10-27

## 2022-10-27 DIAGNOSIS — R00.2 PALPITATIONS: ICD-10-CM

## 2022-10-27 DIAGNOSIS — I48.0 PAROXYSMAL ATRIAL FIBRILLATION: ICD-10-CM

## 2022-10-27 DIAGNOSIS — R06.09 DYSPNEA ON EXERTION: ICD-10-CM

## 2022-10-27 LAB
BASOPHILS # BLD AUTO: 0.03 10*3/MM3 (ref 0–0.2)
BASOPHILS NFR BLD AUTO: 0.5 % (ref 0–1.5)
DEPRECATED RDW RBC AUTO: 46 FL (ref 37–54)
EOSINOPHIL # BLD AUTO: 0.21 10*3/MM3 (ref 0–0.4)
EOSINOPHIL NFR BLD AUTO: 3.2 % (ref 0.3–6.2)
ERYTHROCYTE [DISTWIDTH] IN BLOOD BY AUTOMATED COUNT: 14.3 % (ref 12.3–15.4)
HCT VFR BLD AUTO: 41 % (ref 37.5–51)
HGB BLD-MCNC: 14 G/DL (ref 13–17.7)
IMM GRANULOCYTES # BLD AUTO: 0.02 10*3/MM3 (ref 0–0.05)
IMM GRANULOCYTES NFR BLD AUTO: 0.3 % (ref 0–0.5)
LYMPHOCYTES # BLD AUTO: 1.54 10*3/MM3 (ref 0.7–3.1)
LYMPHOCYTES NFR BLD AUTO: 23.2 % (ref 19.6–45.3)
MCH RBC QN AUTO: 30.4 PG (ref 26.6–33)
MCHC RBC AUTO-ENTMCNC: 34.1 G/DL (ref 31.5–35.7)
MCV RBC AUTO: 88.9 FL (ref 79–97)
MONOCYTES # BLD AUTO: 0.58 10*3/MM3 (ref 0.1–0.9)
MONOCYTES NFR BLD AUTO: 8.7 % (ref 5–12)
NEUTROPHILS NFR BLD AUTO: 4.27 10*3/MM3 (ref 1.7–7)
NEUTROPHILS NFR BLD AUTO: 64.1 % (ref 42.7–76)
NRBC BLD AUTO-RTO: 0 /100 WBC (ref 0–0.2)
PLATELET # BLD AUTO: 177 10*3/MM3 (ref 140–450)
PMV BLD AUTO: 12 FL (ref 6–12)
RBC # BLD AUTO: 4.61 10*6/MM3 (ref 4.14–5.8)
WBC NRBC COR # BLD: 6.65 10*3/MM3 (ref 3.4–10.8)

## 2022-10-27 PROCEDURE — 36415 COLL VENOUS BLD VENIPUNCTURE: CPT

## 2022-10-27 PROCEDURE — 85025 COMPLETE CBC W/AUTO DIFF WBC: CPT

## 2022-10-28 ENCOUNTER — TELEPHONE (OUTPATIENT)
Dept: CARDIOLOGY | Facility: CLINIC | Age: 82
End: 2022-10-28

## 2022-10-31 ENCOUNTER — OFFICE VISIT (OUTPATIENT)
Dept: CARDIOLOGY | Facility: CLINIC | Age: 82
End: 2022-10-31

## 2022-10-31 VITALS
HEART RATE: 87 BPM | SYSTOLIC BLOOD PRESSURE: 139 MMHG | WEIGHT: 230 LBS | HEIGHT: 70 IN | OXYGEN SATURATION: 99 % | DIASTOLIC BLOOD PRESSURE: 95 MMHG | BODY MASS INDEX: 32.93 KG/M2

## 2022-10-31 DIAGNOSIS — Z86.79 STATUS POST ABLATION OF ATRIAL FIBRILLATION: ICD-10-CM

## 2022-10-31 DIAGNOSIS — I25.10 CORONARY ARTERY DISEASE INVOLVING NATIVE CORONARY ARTERY OF NATIVE HEART WITHOUT ANGINA PECTORIS: ICD-10-CM

## 2022-10-31 DIAGNOSIS — Z98.890 STATUS POST ABLATION OF ATRIAL FIBRILLATION: ICD-10-CM

## 2022-10-31 DIAGNOSIS — R00.2 PALPITATIONS: ICD-10-CM

## 2022-10-31 DIAGNOSIS — I10 ESSENTIAL HYPERTENSION: ICD-10-CM

## 2022-10-31 DIAGNOSIS — I48.19 PERSISTENT ATRIAL FIBRILLATION: Primary | ICD-10-CM

## 2022-10-31 PROCEDURE — 93000 ELECTROCARDIOGRAM COMPLETE: CPT | Performed by: INTERNAL MEDICINE

## 2022-10-31 PROCEDURE — 99214 OFFICE O/P EST MOD 30 MIN: CPT | Performed by: INTERNAL MEDICINE

## 2022-10-31 RX ORDER — ASPIRIN 81 MG/1
81 TABLET, CHEWABLE ORAL DAILY
COMMUNITY

## 2022-10-31 NOTE — PROGRESS NOTES
CC--Atrial fibrillation, hypertension    SUB--81-year-old male patient had recent redo ablation for persistent AF and post ablation had groin hematoma with small pseudoaneurysm and AV fistula needing open surgery.  He comes in for follow-up.  He complains that he has intermittent arrhythmia and he has been able to start doing exercises a week ago.  His swelling in the right leg is significantly reduced.  Prior ejection fraction of 60% and prior stress test without ischemia on this patient.  He does have hypertension hyperlipidemia.  His original ablation was done in 2017 and most recent ablation was done in October 2022.          Past Medical History:     Reviewed history from 01/24/2017 and no changes required:        Hyperlipidemia        Hypertension        Obesity        Arthritis        Peripheral vascular disease - < 50% stenoses of the carotids        Atrial fibrillation-Radioablation 12/2016        Prostate biopsy - 2012 - benign        GERD        Coronary artery disease    Past Surgical History:     Reviewed history from 01/16/2017 and no changes required:        Nasal reconstruction        Prostate biopsy(2010)        Parathyroid adenma removed.         Cardioversion - 1/2012; 10/5/2016        CABG - 07/2014        [facility]        Physical Exam    General:      well developed, well nourished, in no acute distress.    Head:      normocephalic and atraumatic.    Eyes:      PERRL/EOM intact, conjunctivae and sclerae clear without nystagmus.    Neck:      no  thyromegaly, trachea central with normal respiratory effort  Lungs:      clear bilaterally to auscultation.    Heart:      ir regular rate and rhythm, S1, S2 without murmurs, rubs, or gallops  Skin:      intact without lesions or rashes.    Psych:      alert and cooperative; normal mood and affect; normal attention span and concentration.      Right groin examination shows near complete healing with resolution of her Jose L with  remarkable improvement compared to evaluation 2 weeks ago.      ASSESSMENT PLAN    Post redo AF ablation and currently in atrial flutter, rate controlled  Post groin pseudoaneurysm and AV fistula repair with significant improvement of feeling in the right groin  Xarelto can be stopped and continue aspirin  Coronary artery disease stable without angina  [facility]ins [facility]  Hypertension controlled  Left atrial appendage ligated and presence of PFO  Follow-up in 2 months      ECG 12 Lead    Date/Time: 10/31/2022 12:46 PM  Performed by: Mark Nolan MD  Authorized by: Mark Nolan MD   Comparison: compared with previous ECG   Similar to previous ECG  Rhythm: atrial flutter  Rate: normal  Other findings: non-specific ST-T wave changes and low voltage          Electronically signed by Mark Nolan MD, 10/31/22, 12:45 PM EDT.

## 2022-12-14 ENCOUNTER — TELEPHONE (OUTPATIENT)
Dept: FAMILY MEDICINE CLINIC | Facility: CLINIC | Age: 82
End: 2022-12-14

## 2022-12-14 RX ORDER — AMOXICILLIN 875 MG/1
875 TABLET, COATED ORAL 2 TIMES DAILY
Qty: 20 TABLET | Refills: 0 | Status: SHIPPED | OUTPATIENT
Start: 2022-12-14 | End: 2023-01-06

## 2022-12-14 NOTE — TELEPHONE ENCOUNTER
Caller: Tin Byrne    Relationship: Self    Best call back number: [phone]    What is the best time to reach you: ANYTIME    Who are you requesting to speak with (clinical staff, provider,  specific staff member): DR ROTHMAN    What was the call regarding: PATIENT STATES HE GETS SINUS INFECTIONS MAYBE TWICE A YEAR AND BELIEVES HE IS GETTING ONE NOW AND WOULD LIKE A CALLBACK FROM DR ROTHMAN TO TALK TO HIM.

## 2022-12-15 NOTE — TELEPHONE ENCOUNTER
Spoke to the patient.  He states that he is felt bad with a sinus infection for the past 1 week.  He states is not getting better.  I agreed to provide amoxicillin.  He is to follow-up with no improvement.

## 2022-12-20 ENCOUNTER — TELEPHONE (OUTPATIENT)
Dept: CARDIOLOGY | Facility: CLINIC | Age: 82
End: 2022-12-20

## 2022-12-20 NOTE — TELEPHONE ENCOUNTER
Caller: [employer]    Relationship: Self    Best call back number: [phone]    What is the best time to reach you: ANY    Who are you requesting to speak with (clinical staff, provider,  specific staff member): ANY      What was the call regarding: PT HAS AN UPCOMING APPT WITH DR CLEMENS ON 01.27.23. HE WAS CALLING IN TO SEE IF THERE COULD BE A EARLIER APPT HE CAN RESCHEDULE. HE NEEDS INFORMATION FROM DR. CLEMENS FOR [facility] PLEASE CONTACT PT PROMPTLY FOR NEXT AVAILABLE.     Do you require a callback: YES

## 2023-01-06 ENCOUNTER — OFFICE VISIT (OUTPATIENT)
Dept: FAMILY MEDICINE CLINIC | Facility: CLINIC | Age: 83
End: 2023-01-06
Payer: MEDICARE

## 2023-01-06 VITALS
HEIGHT: 70 IN | DIASTOLIC BLOOD PRESSURE: 78 MMHG | TEMPERATURE: 98.3 F | SYSTOLIC BLOOD PRESSURE: 138 MMHG | OXYGEN SATURATION: 98 % | WEIGHT: 232 LBS | BODY MASS INDEX: 33.21 KG/M2 | RESPIRATION RATE: 15 BRPM | HEART RATE: 92 BPM

## 2023-01-06 DIAGNOSIS — Z95.1 STATUS POST CORONARY ARTERY BYPASS GRAFT: ICD-10-CM

## 2023-01-06 DIAGNOSIS — I10 PRIMARY HYPERTENSION: Primary | Chronic | ICD-10-CM

## 2023-01-06 DIAGNOSIS — I48.0 PAROXYSMAL ATRIAL FIBRILLATION: Chronic | ICD-10-CM

## 2023-01-06 DIAGNOSIS — E78.5 HYPERLIPIDEMIA, UNSPECIFIED HYPERLIPIDEMIA TYPE: Chronic | ICD-10-CM

## 2023-01-06 DIAGNOSIS — I25.10 CORONARY ARTERY DISEASE INVOLVING NATIVE CORONARY ARTERY OF NATIVE HEART WITHOUT ANGINA PECTORIS: Chronic | ICD-10-CM

## 2023-01-06 PROCEDURE — 99214 OFFICE O/P EST MOD 30 MIN: CPT | Performed by: FAMILY MEDICINE

## 2023-01-06 NOTE — PROGRESS NOTES
Subjective   Tin Byrne is a 82 y.o. male.     Chief Complaint   Patient presents with   • Hypertension     3 mth f/u   • Hyperlipidemia   • Coronary Artery Disease       HPI  Chief complaint: Hypertension hyperlipidemia atrial fibrillation coronary artery disease    The patient is an 82-year-old white male comes in for follow-up and maintenance of his current problems which include    1.  Hypertension-stable-patient is on lisinopril 20 mg daily amlodipine 5 mg nightly.  Blood pressure stable.    2.  Hyperlipidemia-stable Adapin is on Pravachol 20 mg daily.  Denies myalgias and arthralgias.    3.  Atrial fibrillation-stable after patient status post ablation.  He states he has had 2 or 3 ablations.  Patient is on aspirin 81 mg daily.  He denied episodes of rapid or slow heart rhythm.    4.  Coronary artery disease-stable Adapin is on aspirin 81 mg daily.  He denies chest pain shortness of breath orthopnea or PND.    5.  Arthritis-stable- the patient is on diclofenac gel as needed.        The following portions of the patient's history were reviewed and updated as appropriate: allergies, current medications, past family history, past medical history, past social history, past surgical history and problem list.    Review of Systems    Objective     /78   Pulse 92   Temp 98.3 °F (36.8 °C) (Oral)   Resp 15   Ht 177.8 cm (70\")   Wt 105 kg (232 lb)   SpO2 98%   BMI 33.29 kg/m²     Physical Exam  Vitals and nursing note reviewed.   Constitutional:       Appearance: He is well-developed.   HENT:      Head: Normocephalic and atraumatic.   Eyes:      Pupils: Pupils are equal, round, and reactive to light.   Cardiovascular:      Rate and Rhythm: Normal rate and regular rhythm.      Heart sounds: Normal heart sounds.   Pulmonary:      Effort: Pulmonary effort is normal.      Breath sounds: Normal breath sounds.   Abdominal:      General: Bowel sounds are normal.      Palpations: Abdomen is soft.    Musculoskeletal:         General: Normal range of motion.      Cervical back: Neck supple.   Skin:     General: Skin is warm and dry.   Neurological:      Mental Status: He is oriented to person, place, and time.   Psychiatric:         Behavior: Behavior normal.         Thought Content: Thought content normal.         Judgment: Judgment normal.           Assessment & Plan   Diagnoses and all orders for this visit:    1. Primary hypertension (Primary)    2. Hyperlipidemia, unspecified hyperlipidemia type    3. Coronary artery disease involving native coronary artery of native heart without angina pectoris    4. Paroxysmal atrial fibrillation (HCC)    5. Status post coronary artery bypass graft      Patient Instructions   Continue your current medications and treatment.    Follow up in the office in 6 months.    Laboratory testing at that time.      Jose Angel Ramirez Jr., MD    01/06/23

## 2023-01-06 NOTE — PATIENT INSTRUCTIONS
Continue your current medications and treatment.    Follow up in the office in 6 months.    Laboratory testing at that time.

## 2023-01-27 ENCOUNTER — OFFICE VISIT (OUTPATIENT)
Dept: CARDIOLOGY | Facility: CLINIC | Age: 83
End: 2023-01-27
Payer: MEDICARE

## 2023-01-27 VITALS
HEIGHT: 70 IN | SYSTOLIC BLOOD PRESSURE: 161 MMHG | DIASTOLIC BLOOD PRESSURE: 99 MMHG | WEIGHT: 233 LBS | HEART RATE: 84 BPM | OXYGEN SATURATION: 98 % | BODY MASS INDEX: 33.36 KG/M2

## 2023-01-27 DIAGNOSIS — Z86.79 STATUS POST ABLATION OF ATRIAL FIBRILLATION: Primary | ICD-10-CM

## 2023-01-27 DIAGNOSIS — I48.0 PAROXYSMAL ATRIAL FIBRILLATION: ICD-10-CM

## 2023-01-27 DIAGNOSIS — R00.2 PALPITATIONS: ICD-10-CM

## 2023-01-27 DIAGNOSIS — Z98.890 STATUS POST ABLATION OF ATRIAL FIBRILLATION: Primary | ICD-10-CM

## 2023-01-27 DIAGNOSIS — I25.10 CORONARY ARTERY DISEASE INVOLVING NATIVE CORONARY ARTERY OF NATIVE HEART WITHOUT ANGINA PECTORIS: ICD-10-CM

## 2023-01-27 DIAGNOSIS — I10 ESSENTIAL HYPERTENSION: ICD-10-CM

## 2023-01-27 PROBLEM — I72.9 PSEUDOANEURYSM (HCC): Status: RESOLVED | Noted: 2022-10-11 | Resolved: 2023-01-27

## 2023-01-27 PROCEDURE — 93000 ELECTROCARDIOGRAM COMPLETE: CPT | Performed by: INTERNAL MEDICINE

## 2023-01-27 PROCEDURE — 99214 OFFICE O/P EST MOD 30 MIN: CPT | Performed by: INTERNAL MEDICINE

## 2023-01-27 RX ORDER — SPIRONOLACTONE 25 MG/1
12.5 TABLET ORAL DAILY
Qty: 45 TABLET | Refills: 1 | Status: SHIPPED | OUTPATIENT
Start: 2023-01-27

## 2023-01-27 NOTE — PROGRESS NOTES
CC--Atrial fibrillation, hypertension    SUB--82-year-old pleasant patient had redo AF ablation and post ablation regarding hematoma with small pseudoaneurysm and AV fistula needing surgery.  Patient has history of hypertension hyperlipidemia.  His original ablation was done in 2017 and a redo ablation October 2022.  He had remote history of parathyroid adenoma removed.  He does have peripheral vascular disease with less than 50% stenosis of his carotid in the past.  Patient has coronary artery disease with prior bypass surgery with left atrial appendage ligation and a stress test in September 2022 without ischemia with normal EF.        Past Medical History:     Reviewed history from 01/24/2017 and no changes required:        Hyperlipidemia        Hypertension        Obesity        Arthritis        Peripheral vascular disease - < 50% stenoses of the carotids        Atrial fibrillation-Radioablation 12/2016        Prostate biopsy - 2012 - benign        GERD        Coronary artery disease    Past Surgical History:     Reviewed history from 01/16/2017 and no changes required:        Nasal reconstruction        Prostate biopsy(2010)        Parathyroid adenma removed.         Cardioversion - 1/2012; 10/5/2016        CABG - 07/2014        A-flutter ablation 12/2016 [facility]       Physical Exam    General:      well developed, well nourished, in no acute distress.    Head:      normocephalic and atraumatic.    Eyes:      PERRL/EOM intact, conjunctivae and sclerae clear without nystagmus.    Neck:      no  thyromegaly, trachea central with normal respiratory effort  Lungs:      clear bilaterally to auscultation.    Heart:       regular rate and rhythm, S1, S2 without murmurs, rubs, or gallops  Skin:      intact without lesions or rashes.    Psych:      alert and cooperative; normal mood and affect; normal attention span and concentration.          ASSESSMENT PLAN    Post redo AF ablation currently in sinus rhythm doing well.   Redo AF ablation complicated by pseudoaneurysm and AV fistula postsurgery without sequelae  Coronary artery disease with prior bypass surgery stable without angina with prior ischemia evaluation in September 2022 without ischemia.  Hypertension--not well optimized with normal creatinine in the past with potassium of 4.0-- add Aldactone and prescription given and follow-up BMP ordered  [facility]  Prior left atrial appendage ligation and presence of PFO  Post ablation convalesce for 4 weeks--completed   Patient may resume exercise        ECG 12 Lead    Date/Time: 1/27/2023 10:41 AM  Performed by: Mark Nolan MD  Authorized by: Mark Nolan MD   Comparison: compared with previous ECG   Comparison to previous ECG: Current  EKG shows sinus rhythm with PACs compared to previous EKG with atrial flutter, low-voltage complexes and nonspecific ST-T wave changes            Electronically signed by Mark Nolan MD, 01/27/23, 10:48 AM EST.

## 2023-04-28 ENCOUNTER — OFFICE VISIT (OUTPATIENT)
Dept: CARDIOLOGY | Facility: CLINIC | Age: 83
End: 2023-04-28
Payer: MEDICARE

## 2023-04-28 VITALS
HEART RATE: 88 BPM | OXYGEN SATURATION: 96 % | HEIGHT: 70 IN | BODY MASS INDEX: 33.64 KG/M2 | DIASTOLIC BLOOD PRESSURE: 70 MMHG | SYSTOLIC BLOOD PRESSURE: 130 MMHG | WEIGHT: 235 LBS

## 2023-04-28 DIAGNOSIS — R00.2 PALPITATIONS: ICD-10-CM

## 2023-04-28 DIAGNOSIS — I48.0 PAROXYSMAL ATRIAL FIBRILLATION: ICD-10-CM

## 2023-04-28 DIAGNOSIS — Z98.890 STATUS POST ABLATION OF ATRIAL FIBRILLATION: Primary | ICD-10-CM

## 2023-04-28 DIAGNOSIS — I10 ESSENTIAL HYPERTENSION: ICD-10-CM

## 2023-04-28 DIAGNOSIS — Z86.79 STATUS POST ABLATION OF ATRIAL FIBRILLATION: Primary | ICD-10-CM

## 2023-04-28 NOTE — PROGRESS NOTES
CC--Atrial fibrillation, hypertension    SUB--82-year-old male patient was given a prescription of spironolactone last time and he refused to take it because of prior history of gynecomastia from other medication.  He has been checking his blood pressure which is well controlled at home recordings.  He is doing well and mostly is in regular rhythm and occasionally goes into atrial arrhythmia.  Has been exercising and denies any new symptoms.  Patient had a redo AF ablation done in October 2022 with initial ablation done in 2017.  Post second ablation patient had a AV fistula needing surgery.  He has peripheral vascular disease with less than 50% stenosis of his carotid.  Patient has no history of coronary artery disease with prior bypass surgery with left atrial appendage ligation and a stress test in September 2022 without ischemia with normal EF.          Past Medical History:     Reviewed history from 01/24/2017 and no changes required:        Hyperlipidemia        Hypertension        Obesity        Arthritis        Peripheral vascular disease - < 50% stenoses of the carotids        Atrial fibrillation-Radioablation 12/2016        Prostate biopsy - 2012 - benign        GERD        Coronary artery disease    Past Surgical History:     Reviewed history from 01/16/2017 and no changes required:        Nasal reconstruction        Prostate biopsy(2010)        Parathyroid adenma removed.         Cardioversion - 1/2012; 10/5/2016        CABG - 07/2014        A-flutter ablation 12/2016 [facility]     Physical Exam    General:      well developed, well nourished, in no acute distress.    Head:      normocephalic and atraumatic.    Eyes:      PERRL/EOM intact, conjunctivae and sclerae clear without nystagmus.    Neck:      no  thyromegaly, trachea central with normal respiratory effort  Lungs:      clear bilaterally to auscultation.    Heart:       irregular rate and rhythm, S1, S2 without murmurs, rubs, or gallops  Skin:       intact without lesions or rashes.    Psych:      alert and cooperative; normal mood and affect; normal attention span and concentration.          ASSESSMENT PLAN    Post redo AF ablation with intermittent atrial flutter without any significant symptoms  Essential hypertension well-controlled  Medications reviewed and follow-up appointments made  [facility]  Prior left atrial appendage ligation with presence of PFO  Prior bypass surgery without any progressive angina  Normal LV ejection fraction        ECG 12 Lead    Date/Time: 4/28/2023 1:23 PM  Performed by: Mark Nolan MD  Authorized by: Mark Nolan MD   Comparison: compared with previous ECG   Comparison to previous ECG: Atrial flutter replaced sinus rhythm compared to previous EKG            Electronically signed by Mark Nolan MD, 04/28/23, 1:23 PM EDT.

## 2023-04-28 NOTE — LETTER
April 28, 2023       No Recipients    Patient: Tin Byrne   YOB: 1940   Date of Visit: 4/28/2023       Dear Dr. Coker Recipients:    Thank you for referring Tin Byrne to me for evaluation. Below are the relevant portions of my assessment and plan of care.    If you have questions, please do not hesitate to call me. I look forward to following Tin along with you.         Sincerely,        Mark Nolan MD        CC:   No Recipients    Mark Nolan MD  04/28/23 1324  Signed  CC--Atrial fibrillation, hypertension    SUB--82-year-old male patient was given a prescription of spironolactone last time and he refused to take it because of prior history of gynecomastia from other medication.  He has been checking his blood pressure which is well controlled at home recordings.  He is doing well and mostly is in regular rhythm and occasionally goes into atrial arrhythmia.  Has been exercising and denies any new symptoms.  Patient had a redo AF ablation done in October 2022 with initial ablation done in 2017.  Post second ablation patient had a AV fistula needing surgery.  He has peripheral vascular disease with less than 50% stenosis of his carotid.  Patient has no history of coronary artery disease with prior bypass surgery with left atrial appendage ligation and a stress test in September 2022 without ischemia with normal EF.          Past Medical History:     Reviewed history from 01/24/2017 and no changes required:        Hyperlipidemia        Hypertension        Obesity        Arthritis        Peripheral vascular disease - < 50% stenoses of the carotids        Atrial fibrillation-Radioablation 12/2016        Prostate biopsy - 2012 - benign        GERD        Coronary artery disease    Past Surgical History:     Reviewed history from 01/16/2017 and no changes required:        Nasal reconstruction        Prostate biopsy(2010)        Parathyroid adenma removed.          Cardioversion - 1/2012; 10/5/2016        CABG - 07/2014        A-flutter ablation 12/2016 [facility]     Physical Exam    General:      well developed, well nourished, in no acute distress.    Head:      normocephalic and atraumatic.    Eyes:      PERRL/EOM intact, conjunctivae and sclerae clear without nystagmus.    Neck:      no  thyromegaly, trachea central with normal respiratory effort  Lungs:      clear bilaterally to auscultation.    Heart:       irregular rate and rhythm, S1, S2 without murmurs, rubs, or gallops  Skin:      intact without lesions or rashes.    Psych:      alert and cooperative; normal mood and affect; normal attention span and concentration.          ASSESSMENT PLAN    Post redo AF ablation with intermittent atrial flutter without any significant symptoms  Essential hypertension well-controlled  Medications reviewed and follow-up appointments made  [facility]  Prior left atrial appendage ligation with presence of PFO  Prior bypass surgery without any progressive angina  Normal LV ejection fraction        ECG 12 Lead    Date/Time: 4/28/2023 1:23 PM  Performed by: Mark Nolan MD  Authorized by: Mark Nolan MD   Comparison: compared with previous ECG   Comparison to previous ECG: Atrial flutter replaced sinus rhythm compared to previous EKG            Electronically signed by Mark Nolan MD, 04/28/23, 1:23 PM EDT.

## 2023-11-03 ENCOUNTER — OFFICE VISIT (OUTPATIENT)
Dept: CARDIOLOGY | Facility: CLINIC | Age: 83
End: 2023-11-03
Payer: MEDICARE

## 2023-11-03 VITALS
DIASTOLIC BLOOD PRESSURE: 80 MMHG | HEIGHT: 70 IN | HEART RATE: 79 BPM | OXYGEN SATURATION: 99 % | BODY MASS INDEX: 32.93 KG/M2 | SYSTOLIC BLOOD PRESSURE: 124 MMHG | WEIGHT: 230 LBS

## 2023-11-03 DIAGNOSIS — Z98.890 STATUS POST ABLATION OF ATRIAL FIBRILLATION: Primary | ICD-10-CM

## 2023-11-03 DIAGNOSIS — I10 ESSENTIAL HYPERTENSION: ICD-10-CM

## 2023-11-03 DIAGNOSIS — I48.0 PAROXYSMAL ATRIAL FIBRILLATION: ICD-10-CM

## 2023-11-03 DIAGNOSIS — R00.2 PALPITATIONS: ICD-10-CM

## 2023-11-03 DIAGNOSIS — Z86.79 STATUS POST ABLATION OF ATRIAL FIBRILLATION: Primary | ICD-10-CM

## 2023-11-03 DIAGNOSIS — I25.10 CORONARY ARTERY DISEASE INVOLVING NATIVE CORONARY ARTERY OF NATIVE HEART WITHOUT ANGINA PECTORIS: ICD-10-CM

## 2023-11-03 NOTE — PROGRESS NOTES
CC--Atrial fibrillation, hypertension    SUB--82-year-old male patient well-known to me with history of atrial fibrillation hypertension comes in for follow-up.  Patient could not tolerate spironolactone in the past.  Patient had a redo AF ablation in October 2022.  First ablation on this patient was in 2017.  Post second ablation patient had AV fistula needing surgery  Patient had prior vascular disease with less than 50% stenosis of his carotid.  Patient has coronary artery disease prior bypass surgery left atrial appendage ligation and a stress test in 2022 without ischemia with normal EF.            Past Medical History:     Reviewed history from 01/24/2017 and no changes required:        Hyperlipidemia        Hypertension        Obesity        Arthritis        Peripheral vascular disease - < 50% stenoses of the carotids        Atrial fibrillation-Radioablation 12/2016        Prostate biopsy - 2012 - benign        GERD        Coronary artery disease    Past Surgical History:     Reviewed history from 01/16/2017 and no changes required:        Nasal reconstruction        Prostate biopsy(2010)        Parathyroid adenma removed.         Cardioversion - 1/2012; 10/5/2016        CABG - 07/2014        A-flutter ablation 12/2016 [facility]           Physical Exam    General:      well developed, well nourished, in no acute distress.    Head:      normocephalic and atraumatic.    Eyes:      PERRL/EOM intact, conjunctivae and sclerae clear without nystagmus.    Neck:      no  thyromegaly, trachea central with normal respiratory effort  Lungs:      clear bilaterally to auscultation.    Heart:       regular rate and rhythm, S1, S2 without murmurs, rubs, or gallops  Skin:      intact without lesions or rashes.    Psych:      alert and cooperative; normal mood and affect; normal attention span and concentration.                ASSESSMENT PLAN    Post redo AF ablation currently in sinus rhythm  Essential hypertension well-controlled  on home recordings--patient currently on lisinopril and amlodipine  Coronary artery disease with prior bypass surgery without angina  Prior history of left atrial appendage ligation with presence of PFO  Normal LV ejection fraction  Meds reviewed and follow-up appointments made  Hyperlipidemia on pravastatin        ECG 12 Lead    Date/Time: 11/3/2023 10:55 AM  Performed by: Mark Nolan MD    Authorized by: Mark Nolan MD  Comparison: compared with previous ECG   Similar to previous ECG  Rhythm: sinus rhythm  Rate: normal  Conduction: 1st degree AV block      Electronically signed by Mark Nolan MD, 11/03/23, 10:54 AM EDT.

## 2024-01-08 ENCOUNTER — OFFICE VISIT (OUTPATIENT)
Dept: FAMILY MEDICINE CLINIC | Facility: CLINIC | Age: 84
End: 2024-01-08
Payer: MEDICARE

## 2024-01-08 VITALS
HEIGHT: 70 IN | SYSTOLIC BLOOD PRESSURE: 141 MMHG | DIASTOLIC BLOOD PRESSURE: 68 MMHG | WEIGHT: 232.8 LBS | HEART RATE: 95 BPM | OXYGEN SATURATION: 96 % | BODY MASS INDEX: 33.33 KG/M2 | RESPIRATION RATE: 18 BRPM

## 2024-01-08 DIAGNOSIS — I25.10 CORONARY ARTERY DISEASE INVOLVING NATIVE CORONARY ARTERY OF NATIVE HEART WITHOUT ANGINA PECTORIS: ICD-10-CM

## 2024-01-08 DIAGNOSIS — E78.5 HYPERLIPIDEMIA, UNSPECIFIED HYPERLIPIDEMIA TYPE: ICD-10-CM

## 2024-01-08 DIAGNOSIS — I48.0 PAROXYSMAL ATRIAL FIBRILLATION: ICD-10-CM

## 2024-01-08 DIAGNOSIS — I10 PRIMARY HYPERTENSION: Primary | ICD-10-CM

## 2024-01-08 PROCEDURE — 99214 OFFICE O/P EST MOD 30 MIN: CPT | Performed by: STUDENT IN AN ORGANIZED HEALTH CARE EDUCATION/TRAINING PROGRAM

## 2024-01-08 PROCEDURE — 1159F MED LIST DOCD IN RCRD: CPT | Performed by: STUDENT IN AN ORGANIZED HEALTH CARE EDUCATION/TRAINING PROGRAM

## 2024-01-08 PROCEDURE — 3077F SYST BP >= 140 MM HG: CPT | Performed by: STUDENT IN AN ORGANIZED HEALTH CARE EDUCATION/TRAINING PROGRAM

## 2024-01-08 PROCEDURE — 1160F RVW MEDS BY RX/DR IN RCRD: CPT | Performed by: STUDENT IN AN ORGANIZED HEALTH CARE EDUCATION/TRAINING PROGRAM

## 2024-01-08 PROCEDURE — 3078F DIAST BP <80 MM HG: CPT | Performed by: STUDENT IN AN ORGANIZED HEALTH CARE EDUCATION/TRAINING PROGRAM

## 2024-01-08 NOTE — PROGRESS NOTES
"Chief Complaint  Chief Complaint   Patient presents with    Establish Care     Subjective        Tin Byrne is a 83 y.o. male who presents to [facility].    History of Present Illness  Here to establish care with me.  Previous patient of Dr. Ramirez.    We reviewed medical history and current medications.    HTN on amlodipine 5 mg daily, lisinopril 20 mg daily.  His blood pressure at home is always < 140/90.  HLD on pravastatin 20 mg daily.  CAD on asa 81 mg daily.      Sees Dr. Nolan (cardiology).  Sees Dr. Hernandez (ENT).  He was seen for leukoplakia, biopsy was negative.    He sees derm at [facility].    [facility]      He exercises 30-45 minutes nearly every day.      Objective   /68   Pulse 95   Resp 18   Ht 177.8 cm (70\")   Wt 106 kg (232 lb 12.8 oz)   SpO2 96%   BMI 33.40 kg/m²     Estimated body mass index is 33.4 kg/m² as calculated from the following:    Height as of this encounter: 177.8 cm (70\").    Weight as of this encounter: 106 kg (232 lb 12.8 oz).     Physical Exam   GEN: In no acute distress, non toxic appearing  HEENT: Pupils equal and reactive to light, sclera clear. Mucous membranes moist. Oropharynx without erythema or exudate. No cervical or submandibular lymphadenopathy.  TM's wnl.    CV: Regular rate and rhythm, no murmurs, 2+ peripheral pulses, No extremity edema.   RESP: Lungs clear to auscultation anteriorly and posteriorly in all lung fields bilaterally.  NEURO: AAO to person, place, and time. CN 2-12 intact grossly.   PSYCH: Affect normal, insight fair     PHQ-2 Depression Screening  Little interest or pleasure in doing things? 0-->not at all   Feeling down, depressed, or hopeless? 0-->not at all   PHQ-2 Total Score 0      Result Review :              Assessment and Plan     Diagnoses and all orders for this visit:    1. Primary hypertension (Primary)  BP slightly elevated today but he reports " normal BP's at home.  Continue amlodipine 5 mg daily, lisinopril 20 mg daily.    2. Hyperlipidemia, unspecified hyperlipidemia type  Continue pravastatin 20 mg daily.  [facility]    3. Paroxysmal atrial fibrillation  Continue f/u with Dr. Nolan.  He has had 3 ablations in the past.  On low dose asa 81 mg daily, continue.    4. Coronary artery disease involving native coronary artery of native heart without angina pectoris  See above        Follow Up     Return in about 6 months (around 7/8/2024) for Medicare Wellness.

## 2024-05-07 ENCOUNTER — TELEPHONE (OUTPATIENT)
Dept: FAMILY MEDICINE CLINIC | Facility: CLINIC | Age: 84
End: 2024-05-07
Payer: MEDICARE

## 2024-05-17 ENCOUNTER — OFFICE VISIT (OUTPATIENT)
Dept: CARDIOLOGY | Facility: CLINIC | Age: 84
End: 2024-05-17
Payer: MEDICARE

## 2024-05-17 VITALS
HEIGHT: 70 IN | WEIGHT: 225 LBS | DIASTOLIC BLOOD PRESSURE: 76 MMHG | HEART RATE: 77 BPM | SYSTOLIC BLOOD PRESSURE: 136 MMHG | OXYGEN SATURATION: 99 % | BODY MASS INDEX: 32.21 KG/M2

## 2024-05-17 DIAGNOSIS — I10 ESSENTIAL HYPERTENSION: ICD-10-CM

## 2024-05-17 DIAGNOSIS — I48.0 PAROXYSMAL ATRIAL FIBRILLATION: Primary | ICD-10-CM

## 2024-05-17 DIAGNOSIS — R00.2 PALPITATIONS: ICD-10-CM

## 2024-05-17 DIAGNOSIS — Z98.890 STATUS POST ABLATION OF ATRIAL FIBRILLATION: ICD-10-CM

## 2024-05-17 DIAGNOSIS — Z86.79 STATUS POST ABLATION OF ATRIAL FIBRILLATION: ICD-10-CM

## 2024-05-17 DIAGNOSIS — I25.10 CORONARY ARTERY DISEASE INVOLVING NATIVE CORONARY ARTERY OF NATIVE HEART WITHOUT ANGINA PECTORIS: ICD-10-CM

## 2024-05-17 DIAGNOSIS — E78.2 MIXED HYPERLIPIDEMIA: ICD-10-CM

## 2024-05-17 PROCEDURE — 1159F MED LIST DOCD IN RCRD: CPT | Performed by: INTERNAL MEDICINE

## 2024-05-17 PROCEDURE — 1160F RVW MEDS BY RX/DR IN RCRD: CPT | Performed by: INTERNAL MEDICINE

## 2024-05-17 PROCEDURE — 99214 OFFICE O/P EST MOD 30 MIN: CPT | Performed by: INTERNAL MEDICINE

## 2024-05-17 PROCEDURE — 3075F SYST BP GE 130 - 139MM HG: CPT | Performed by: INTERNAL MEDICINE

## 2024-05-17 PROCEDURE — 3078F DIAST BP <80 MM HG: CPT | Performed by: INTERNAL MEDICINE

## 2024-05-17 PROCEDURE — 93000 ELECTROCARDIOGRAM COMPLETE: CPT | Performed by: INTERNAL MEDICINE

## 2024-05-17 NOTE — LETTER
May 17, 2024       No Recipients    Patient: Tin Byrne   YOB: 1940   Date of Visit: 5/17/2024     Dear Jerson Renner, DO:       Thank you for referring Tin Byrne to me for evaluation. Below are the relevant portions of my assessment and plan of care.    If you have questions, please do not hesitate to call me. I look forward to following Tin along with you.         Sincerely,        Mark Nolan MD        CC:   No Recipients    Mark Nolan MD  05/17/24 1107  Sign when Signing Visit  CC--Atrial fibrillation, hypertension    SUB--83-year-old pleasant patient has history of atrial fibrillation hypertension comes in for follow-up.  Patient could not tolerate Aldactone in the past and has history of redo AF ablation October 2022 and first ablation 2017.  Unfortunately with second ablation patient had AV fistula needing surgery and has history of less than 50% stenosis of the carotid.  Patient had coronary artery bypass surgery with left renal appendage ligation and a stress test in 2022 without ischemia with normal EF.  He had recent URI and took antibiotic and is feeling better and complains of intermittent AF and blood pressure well-controlled on home recordings        Past Medical History:     Reviewed history from 01/24/2017 and no changes required:        Hyperlipidemia        Hypertension        Obesity        Arthritis        Peripheral vascular disease - < 50% stenoses of the carotids        Atrial fibrillation-Radioablation 12/2016        Prostate biopsy - 2012 - benign        GERD        Coronary artery disease    Past Surgical History:     Reviewed history from 01/16/2017 and no changes required:        Nasal reconstruction        Prostate biopsy(2010)        Parathyroid adenma removed.         Cardioversion - 1/2012; 10/5/2016        CABG - 07/2014        A-flutter ablation 12/2016 [facility]         Physical Exam    General:      well developed, well  nourished, in no acute distress.    Head:      normocephalic and atraumatic.    Eyes:      PERRL/EOM intact, conjunctivae and sclerae clear without nystagmus.    Neck:      no  thyromegaly, trachea central with normal respiratory effort  Lungs:      clear bilaterally to auscultation.    Heart:       irregular rate and rhythm, S1, S2 without murmurs, rubs, or gallops  Skin:      intact without lesions or rashes.    Psych:      alert and cooperative; normal mood and affect; normal attention span and concentration.                ASSESSMENT PLAN    Post redo AF ablation currently on intermittent AF and patient is fairly comfortable and reevaluate in 3 months and patient currently on aspirin with prior left atrial appendage ligation  Hyperlipidemia on pravastatin  Hypertension well-controlled with amlodipine and lisinopril  Presence of PFO on aspirin  Coronary artery disease with prior bypass surgery without angina on aspirin  Medications reviewed and follow-up appointment      ECG 12 Lead    Date/Time: 5/17/2024 11:07 AM  Performed by: Mark Nolan MD    Authorized by: Mark Nolan MD  Comparison: compared with previous ECG   Comparison to previous ECG: AF replaced sinus rhythm      Electronically signed by Mark Nolan MD, 05/17/24, 11:07 AM EDT.

## 2024-05-17 NOTE — PROGRESS NOTES
CC--Atrial fibrillation, hypertension    SUB--83-year-old pleasant patient has history of atrial fibrillation hypertension comes in for follow-up.  Patient could not tolerate Aldactone in the past and has history of redo AF ablation October 2022 and first ablation 2017.  Unfortunately with second ablation patient had AV fistula needing surgery and has history of less than 50% stenosis of the carotid.  Patient had coronary artery bypass surgery with left renal appendage ligation and a stress test in 2022 without ischemia with normal EF.  He had recent URI and took antibiotic and is feeling better and complains of intermittent AF and blood pressure well-controlled on home recordings        Past Medical History:     Reviewed history from 01/24/2017 and no changes required:        Hyperlipidemia        Hypertension        Obesity        Arthritis        Peripheral vascular disease - < 50% stenoses of the carotids        Atrial fibrillation-Radioablation 12/2016        Prostate biopsy - 2012 - benign        GERD        Coronary artery disease    Past Surgical History:     Reviewed history from 01/16/2017 and no changes required:        Nasal reconstruction        Prostate biopsy(2010)        Parathyroid adenma removed.         Cardioversion - 1/2012; 10/5/2016        CABG - 07/2014        A-flutter ablation 12/2016 [facility]         Physical Exam    General:      well developed, well nourished, in no acute distress.    Head:      normocephalic and atraumatic.    Eyes:      PERRL/EOM intact, conjunctivae and sclerae clear without nystagmus.    Neck:      no  thyromegaly, trachea central with normal respiratory effort  Lungs:      clear bilaterally to auscultation.    Heart:       irregular rate and rhythm, S1, S2 without murmurs, rubs, or gallops  Skin:      intact without lesions or rashes.    Psych:      alert and cooperative; normal mood and affect; normal attention span and concentration.                ASSESSMENT  PLAN    Post redo AF ablation currently on intermittent AF and patient is fairly comfortable and reevaluate in 3 months and patient currently on aspirin with prior left atrial appendage ligation  Hyperlipidemia on pravastatin  Hypertension well-controlled with amlodipine and lisinopril  Presence of PFO on aspirin  Coronary artery disease with prior bypass surgery without angina on aspirin  Medications reviewed and follow-up appointment      ECG 12 Lead    Date/Time: 5/17/2024 11:07 AM  Performed by: Mark Nolan MD    Authorized by: Mark Nolan MD  Comparison: compared with previous ECG   Comparison to previous ECG: AF replaced sinus rhythm      Electronically signed by Mark Nolan MD, 05/17/24, 11:07 AM EDT.

## 2024-07-12 ENCOUNTER — OFFICE VISIT (OUTPATIENT)
Dept: FAMILY MEDICINE CLINIC | Facility: CLINIC | Age: 84
End: 2024-07-12
Payer: MEDICARE

## 2024-07-12 VITALS
WEIGHT: 229.8 LBS | SYSTOLIC BLOOD PRESSURE: 146 MMHG | DIASTOLIC BLOOD PRESSURE: 87 MMHG | OXYGEN SATURATION: 97 % | RESPIRATION RATE: 18 BRPM | HEIGHT: 70 IN | HEART RATE: 92 BPM | BODY MASS INDEX: 32.9 KG/M2

## 2024-07-12 DIAGNOSIS — I10 PRIMARY HYPERTENSION: Chronic | ICD-10-CM

## 2024-07-12 DIAGNOSIS — I25.10 CORONARY ARTERY DISEASE INVOLVING NATIVE CORONARY ARTERY OF NATIVE HEART WITHOUT ANGINA PECTORIS: Chronic | ICD-10-CM

## 2024-07-12 DIAGNOSIS — E78.5 HYPERLIPIDEMIA, UNSPECIFIED HYPERLIPIDEMIA TYPE: Chronic | ICD-10-CM

## 2024-07-12 DIAGNOSIS — Z00.00 MEDICARE ANNUAL WELLNESS VISIT, SUBSEQUENT: Primary | ICD-10-CM

## 2024-07-12 PROCEDURE — 1170F FXNL STATUS ASSESSED: CPT | Performed by: STUDENT IN AN ORGANIZED HEALTH CARE EDUCATION/TRAINING PROGRAM

## 2024-07-12 PROCEDURE — 1126F AMNT PAIN NOTED NONE PRSNT: CPT | Performed by: STUDENT IN AN ORGANIZED HEALTH CARE EDUCATION/TRAINING PROGRAM

## 2024-07-12 PROCEDURE — 1159F MED LIST DOCD IN RCRD: CPT | Performed by: STUDENT IN AN ORGANIZED HEALTH CARE EDUCATION/TRAINING PROGRAM

## 2024-07-12 PROCEDURE — 3079F DIAST BP 80-89 MM HG: CPT | Performed by: STUDENT IN AN ORGANIZED HEALTH CARE EDUCATION/TRAINING PROGRAM

## 2024-07-12 PROCEDURE — 1160F RVW MEDS BY RX/DR IN RCRD: CPT | Performed by: STUDENT IN AN ORGANIZED HEALTH CARE EDUCATION/TRAINING PROGRAM

## 2024-07-12 PROCEDURE — G0439 PPPS, SUBSEQ VISIT: HCPCS | Performed by: STUDENT IN AN ORGANIZED HEALTH CARE EDUCATION/TRAINING PROGRAM

## 2024-07-12 PROCEDURE — 3077F SYST BP >= 140 MM HG: CPT | Performed by: STUDENT IN AN ORGANIZED HEALTH CARE EDUCATION/TRAINING PROGRAM

## 2024-07-12 NOTE — PROGRESS NOTES
The ABCs of the Annual Wellness Visit  Subsequent Medicare Wellness Visit    Subjective    Tin Byrne is a 83 y.o. male who presents for a Subsequent Medicare Wellness Visit.    The following portions of the patient's history were reviewed and   updated as appropriate: allergies, current medications, past family history, past medical history, past social history, past surgical history, and problem list.    Compared to one year ago, the patient feels his physical   health is the same.    Compared to one year ago, the patient feels his mental   health is the same.    Recent Hospitalizations:  He was not admitted to the hospital during the last year.     Current Medical Providers:  Patient Care Team:  Jerson Renner DO as PCP - General (Family Medicine)  Mark Nolan MD as Consulting Physician (Cardiac Electrophysiology)  Ruel Hernandez MD as Consulting Physician (Otolaryngology)    Outpatient Medications Prior to Visit   Medication Sig Dispense Refill    amLODIPine (NORVASC) 5 MG tablet Take 1 tablet by mouth Every Night.      aspirin 81 MG chewable tablet Chew 1 tablet Daily.      lisinopril (PRINIVIL,ZESTRIL) 40 MG tablet Take 0.5 tablets by mouth Daily.      pravastatin (PRAVACHOL) 20 MG tablet Take 1 tablet by mouth Every Night.       No facility-administered medications prior to visit.     No opioid medication identified on active medication list. I have reviewed chart for other potential  high risk medication/s and harmful drug interactions in the elderly.        Aspirin is on active medication list. Aspirin use is indicated based on review of current medical condition/s. Pros and cons of this therapy have been discussed today. Benefits of this medication outweigh potential harm.  Patient has been encouraged to continue taking this medication.  .      Patient Active Problem List   Diagnosis    Hypertension    Hyperlipidemia    Paroxysmal atrial fibrillation    Coronary artery disease     "Arthritis    Gastroesophageal reflux disease    Status post coronary artery bypass graft    Palpitations     Advance Care Planning   Advance Care Planning     Advance Directive is on file.  ACP discussion was held with the patient during this visit. Patient has an advance directive in EMR which is still valid.      Objective    Vitals:    24 0944   BP: 146/87   Pulse: 92   Resp: 18   SpO2: 97%   Weight: 104 kg (229 lb 12.8 oz)   Height: 177.8 cm (70\")     Estimated body mass index is 32.97 kg/m² as calculated from the following:    Height as of this encounter: 177.8 cm (70\").    Weight as of this encounter: 104 kg (229 lb 12.8 oz).    BMI is >= 30 and <35. (Class 1 Obesity). The following options were offered after discussion;: exercise counseling/recommendations and nutrition counseling/recommendations    Does the patient have evidence of cognitive impairment? No        HEALTH RISK ASSESSMENT    Smoking Status:  Social History     Tobacco Use   Smoking Status Former    Current packs/day: 0.00    Average packs/day: 0.3 packs/day for 4.0 years (1.0 ttl pk-yrs)    Types: Cigarettes, Cigars    Start date: 1963    Quit date: 1967    Years since quittin.5    Passive exposure: Past   Smokeless Tobacco Never     Alcohol Consumption:  Social History     Substance and Sexual Activity   Alcohol Use Yes    Alcohol/week: 1.0 standard drink of alcohol    Types: 1 Glasses of wine per week     Fall Risk Screen:    EUGENIO Fall Risk Assessment was completed, and patient is at LOW risk for falls.Assessment completed on:2024    Depression Screenin/12/2024     9:47 AM   PHQ-2/PHQ-9 Depression Screening   Little Interest or Pleasure in Doing Things 0-->not at all   Feeling Down, Depressed or Hopeless 0-->not at all   PHQ-9: Brief Depression Severity Measure Score 0       Health Habits and Functional and Cognitive Screenin/12/2024     9:48 AM   Functional & Cognitive Status   Do you have " difficulty preparing food and eating? No   Do you have difficulty bathing yourself, getting dressed or grooming yourself? No   Do you have difficulty using the toilet? No   Do you have difficulty moving around from place to place? No   Do you have trouble with steps or getting out of a bed or a chair? No   Current Diet Well Balanced Diet   Dental Exam Up to date   Eye Exam Not up to date   Exercise (times per week) 5 times per week   Current Exercises Include Other   Do you need help using the phone?  No   Are you deaf or do you have serious difficulty hearing?  Yes   Do you need help to go to places out of walking distance? No   Do you need help shopping? No   Do you need help preparing meals?  No   Do you need help with housework?  No   Do you need help with laundry? No   Do you need help taking your medications? No   Do you need help managing money? No   Do you ever drive or ride in a car without wearing a seat belt? No   Have you felt unusual stress, anger or loneliness in the last month? No   Who do you live with? Spouse   If you need help, do you have trouble finding someone available to you? No   Have you been bothered in the last four weeks by sexual problems? No   Do you have difficulty concentrating, remembering or making decisions? No       Age-appropriate Screening Schedule:  Refer to the list below for future screening recommendations based on patient's age, sex and/or medical conditions. Orders for these recommended tests are listed in the plan section. The patient has been provided with a written plan.    Health Maintenance   Topic Date Due    RSV Vaccine - Adults (1 - 1-dose 60+ series) Never done    BMI FOLLOWUP  08/30/2023    COVID-19 Vaccine (4 - 2023-24 season) 09/01/2023    LIPID PANEL  12/02/2023    INFLUENZA VACCINE  08/01/2024    TDAP/TD VACCINES (2 - Td or Tdap) 11/04/2024    ANNUAL WELLNESS VISIT  07/12/2025    Pneumococcal Vaccine 65+  Completed    ZOSTER VACCINE  Completed             "      CMS Preventative Services Quick Reference  Risk Factors Identified During Encounter  Immunizations Discussed/Encouraged: RSV (Respiratory Syncytial Virus)  The above risks/problems have been discussed with the patient.  Pertinent information has been shared with the patient in the After Visit Summary.  An After Visit Summary and PPPS were made available to the patient.    Follow Up:   Next Medicare Wellness visit to be scheduled in 1 year.       Additional E&M Note during same encounter follows:  Patient has multiple medical problems which are significant and separately identifiable that require additional work above and beyond the Medicare Wellness Visit.      Chief Complaint  Medicare Wellness-subsequent    Subjective        HPI  HTN on amlodipine 5 mg daily, lisinopril 20 mg daily.  HLD on pravastatin 20 mg daily.  CAD on asa 81 mg daily.      Sees Dr. Nolan (cardiology).  Sees Dr. Hernandez (ENT).    [facility]     [facility]    The blood work is q6 months.      Objective   Vital Signs:  /87   Pulse 92   Resp 18   Ht 177.8 cm (70\")   Wt 104 kg (229 lb 12.8 oz)   SpO2 97%   BMI 32.97 kg/m²     GEN: In no acute distress, non toxic appearing  HEENT: Pupils equal and reactive to light, sclera clear. Mucous membranes moist. Oropharynx without erythema or exudate. No cervical or submandibular lymphadenopathy.  Bilateral TM's wnl.    CV: Regular rate and rhythm, no murmurs, 2+ peripheral pulses, No extremity edema.   RESP: Lungs clear to auscultation anteriorly and posteriorly in all lung fields bilaterally.  NEURO: AAO to person, place, and time. CN 2-12 intact grossly.   PSYCH: Affect normal, insight fair              Assessment and Plan   Diagnoses and all orders for this visit:    1. Medicare annual wellness visit, subsequent (Primary)  2. Primary hypertension  3. Hyperlipidemia, unspecified hyperlipidemia type  4. Coronary artery disease involving native coronary " artery of native heart without angina pectoris  Overall reassuring exam.  Continue current medication management.  [facility] follow-[facility] with [facility] well as [facility].  No need for blood work today, he will bring me a copy of [facility].  Encouraged regular physical activity which she does get going to the gym as well as working around the house.  Continue healthy diet with plenty of fruits, vegetables, water.  Continue amlodipine 5 mg daily, lisinopril 20 mg daily.  Continue pravastatin 20 mg daily.  Continue aspirin 81 mg daily.  Next wellness in 1 year, follow-up 6 months for chronic conditions.      Follow Up   Return in about 6 months (around 1/12/2025) for chronic conditions recheck.  Patient was given instructions and counseling regarding his condition or for health maintenance advice. Please see specific information pulled into the AVS if appropriate.

## 2024-08-26 ENCOUNTER — OFFICE VISIT (OUTPATIENT)
Dept: CARDIOLOGY | Facility: CLINIC | Age: 84
End: 2024-08-26
Payer: MEDICARE

## 2024-08-26 VITALS
BODY MASS INDEX: 32.07 KG/M2 | HEIGHT: 70 IN | DIASTOLIC BLOOD PRESSURE: 70 MMHG | OXYGEN SATURATION: 97 % | SYSTOLIC BLOOD PRESSURE: 129 MMHG | HEART RATE: 80 BPM | WEIGHT: 224 LBS

## 2024-08-26 DIAGNOSIS — I25.10 CORONARY ARTERY DISEASE INVOLVING NATIVE CORONARY ARTERY OF NATIVE HEART WITHOUT ANGINA PECTORIS: ICD-10-CM

## 2024-08-26 DIAGNOSIS — E78.2 MIXED HYPERLIPIDEMIA: ICD-10-CM

## 2024-08-26 DIAGNOSIS — I10 ESSENTIAL HYPERTENSION: ICD-10-CM

## 2024-08-26 DIAGNOSIS — Z86.79 STATUS POST ABLATION OF ATRIAL FIBRILLATION: ICD-10-CM

## 2024-08-26 DIAGNOSIS — R00.2 PALPITATIONS: ICD-10-CM

## 2024-08-26 DIAGNOSIS — Z98.890 STATUS POST ABLATION OF ATRIAL FIBRILLATION: ICD-10-CM

## 2024-08-26 DIAGNOSIS — I48.0 PAROXYSMAL ATRIAL FIBRILLATION: Primary | ICD-10-CM

## 2024-08-26 PROCEDURE — 93000 ELECTROCARDIOGRAM COMPLETE: CPT | Performed by: INTERNAL MEDICINE

## 2024-08-26 PROCEDURE — 99213 OFFICE O/P EST LOW 20 MIN: CPT | Performed by: INTERNAL MEDICINE

## 2024-08-26 PROCEDURE — 3074F SYST BP LT 130 MM HG: CPT | Performed by: INTERNAL MEDICINE

## 2024-08-26 PROCEDURE — 3078F DIAST BP <80 MM HG: CPT | Performed by: INTERNAL MEDICINE

## 2024-08-26 PROCEDURE — 1160F RVW MEDS BY RX/DR IN RCRD: CPT | Performed by: INTERNAL MEDICINE

## 2024-08-26 PROCEDURE — 1159F MED LIST DOCD IN RCRD: CPT | Performed by: INTERNAL MEDICINE

## 2024-08-26 NOTE — LETTER
August 26, 2024       No Recipients    Patient: Tin Byrne   YOB: 1940   Date of Visit: 8/26/2024     Dear Jerson Renner, DO:       Thank you for referring Tin Byrne to me for evaluation. Below are the relevant portions of my assessment and plan of care.    If you have questions, please do not hesitate to call me. I look forward to following Tin along with you.         Sincerely,        Mark Nolan MD        CC:   No Recipients    Mark Nolan MD  08/26/24 1110  Sign when Signing Visit  CC--Atrial fibrillation, hypertension    SUB--83-year-old andrea patient with atrial fibrillation comes in for follow-up  Patient has hypertension    My previous history is attached below for reference    83-year-old andrea patient has history of atrial fibrillation hypertension comes in for follow-up.  Patient could not tolerate Aldactone in the past and has history of redo AF ablation October 2022 and first ablation 2017.  Unfortunately with second ablation patient had AV fistula needing surgery and has history of less than 50% stenosis of the carotid.  Patient had coronary artery bypass surgery with left renal appendage ligation and a stress test in 2022 without ischemia with normal EF.  He had recent URI and took antibiotic and is feeling better and complains of intermittent AF and blood pressure well-controlled on home recordings        Past Medical History:     Reviewed history from 01/24/2017 and no changes required:        Hyperlipidemia        Hypertension        Obesity        Arthritis        Peripheral vascular disease - < 50% stenoses of the carotids        Atrial fibrillation-Radioablation 12/2016        Prostate biopsy - 2012 - benign        GERD        Coronary artery disease    Past Surgical History:     Reviewed history from 01/16/2017 and no changes required:        Nasal reconstruction        Prostate biopsy(2010)        Parathyroid adenma removed.          Cardioversion - 1/2012; 10/5/2016        CABG - 07/2014        A-flutter ablation 12/2016 [facility]           Physical Exam    General:      well developed, well nourished, in no acute distress.    Head:      normocephalic and atraumatic.    Eyes:      PERRL/EOM intact, conjunctivae and sclerae clear without nystagmus.    Neck:      no  thyromegaly, trachea central with normal respiratory effort  Lungs:      clear bilaterally to auscultation.    Heart:       regular rate and rhythm, S1, S2 without murmurs, rubs, or gallops  Skin:      intact without lesions or rashes.    Psych:      alert and cooperative; normal mood and affect; normal attention span and concentration.              ASSESSMENT PLAN    Post redo AF ablation currently in sinus rhythm uncomfortable with prior left atrial appendage ligation on aspirin  Hypertension well-controlled on amlodipine and lisinopril on home recordings  Hyperlipidemia on pravastatin  Presence of PFO on aspirin  Coronary artery disease with prior bypass surgery without angina on aspirin        ECG 12 Lead    Date/Time: 8/26/2024 11:05 AM  Performed by: Mark Nolan MD    Authorized by: Mark Nolan MD  Comparison: compared with previous ECG   Comparison to previous ECG: Sinus rhythm replaced AF  Rhythm: sinus rhythm  Rate: normal  Conduction: 1st degree AV block  Other findings: non-specific ST-T wave changes      Electronically signed by Mark Nolan MD, 08/26/24, 11:05 AM EDT.

## 2024-08-26 NOTE — PROGRESS NOTES
CC--Atrial fibrillation, hypertension    SUB--83-year-old andrea patient with atrial fibrillation comes in for follow-up  Patient has hypertension    My previous history is attached below for reference    83-year-old andrea patient has history of atrial fibrillation hypertension comes in for follow-up.  Patient could not tolerate Aldactone in the past and has history of redo AF ablation October 2022 and first ablation 2017.  Unfortunately with second ablation patient had AV fistula needing surgery and has history of less than 50% stenosis of the carotid.  Patient had coronary artery bypass surgery with left renal appendage ligation and a stress test in 2022 without ischemia with normal EF.  He had recent URI and took antibiotic and is feeling better and complains of intermittent AF and blood pressure well-controlled on home recordings        Past Medical History:     Reviewed history from 01/24/2017 and no changes required:        Hyperlipidemia        Hypertension        Obesity        Arthritis        Peripheral vascular disease - < 50% stenoses of the carotids        Atrial fibrillation-Radioablation 12/2016        Prostate biopsy - 2012 - benign        GERD        Coronary artery disease    Past Surgical History:     Reviewed history from 01/16/2017 and no changes required:        Nasal reconstruction        Prostate biopsy(2010)        Parathyroid adenma removed.         Cardioversion - 1/2012; 10/5/2016        CABG - 07/2014        A-flutter ablation 12/2016 [facility]           Physical Exam    General:      well developed, well nourished, in no acute distress.    Head:      normocephalic and atraumatic.    Eyes:      PERRL/EOM intact, conjunctivae and sclerae clear without nystagmus.    Neck:      no  thyromegaly, trachea central with normal respiratory effort  Lungs:      clear bilaterally to auscultation.    Heart:       regular rate and rhythm, S1, S2 without murmurs, rubs, or gallops  Skin:      intact  without lesions or rashes.    Psych:      alert and cooperative; normal mood and affect; normal attention span and concentration.              ASSESSMENT PLAN    Post redo AF ablation currently in sinus rhythm uncomfortable with prior left atrial appendage ligation on aspirin  Hypertension well-controlled on amlodipine and lisinopril on home recordings  Hyperlipidemia on pravastatin  Presence of PFO on aspirin  Coronary artery disease with prior bypass surgery without angina on aspirin        ECG 12 Lead    Date/Time: 8/26/2024 11:05 AM  Performed by: Mark Nolan MD    Authorized by: Mark Nolan MD  Comparison: compared with previous ECG   Comparison to previous ECG: Sinus rhythm replaced AF  Rhythm: sinus rhythm  Rate: normal  Conduction: 1st degree AV block  Other findings: non-specific ST-T wave changes      Electronically signed by Mark Nolan MD, 08/26/24, 11:05 AM EDT.

## 2025-01-17 ENCOUNTER — OFFICE VISIT (OUTPATIENT)
Dept: FAMILY MEDICINE CLINIC | Facility: CLINIC | Age: 85
End: 2025-01-17
Payer: MEDICARE

## 2025-01-17 VITALS
SYSTOLIC BLOOD PRESSURE: 116 MMHG | HEART RATE: 85 BPM | WEIGHT: 223.4 LBS | BODY MASS INDEX: 31.98 KG/M2 | HEIGHT: 70 IN | DIASTOLIC BLOOD PRESSURE: 74 MMHG | RESPIRATION RATE: 18 BRPM | OXYGEN SATURATION: 99 %

## 2025-01-17 DIAGNOSIS — I10 PRIMARY HYPERTENSION: Primary | Chronic | ICD-10-CM

## 2025-01-17 DIAGNOSIS — E78.5 HYPERLIPIDEMIA, UNSPECIFIED HYPERLIPIDEMIA TYPE: Chronic | ICD-10-CM

## 2025-01-17 DIAGNOSIS — I25.10 CORONARY ARTERY DISEASE INVOLVING NATIVE CORONARY ARTERY OF NATIVE HEART WITHOUT ANGINA PECTORIS: Chronic | ICD-10-CM

## 2025-01-17 PROCEDURE — 1159F MED LIST DOCD IN RCRD: CPT | Performed by: STUDENT IN AN ORGANIZED HEALTH CARE EDUCATION/TRAINING PROGRAM

## 2025-01-17 PROCEDURE — 3074F SYST BP LT 130 MM HG: CPT | Performed by: STUDENT IN AN ORGANIZED HEALTH CARE EDUCATION/TRAINING PROGRAM

## 2025-01-17 PROCEDURE — 99214 OFFICE O/P EST MOD 30 MIN: CPT | Performed by: STUDENT IN AN ORGANIZED HEALTH CARE EDUCATION/TRAINING PROGRAM

## 2025-01-17 PROCEDURE — 3078F DIAST BP <80 MM HG: CPT | Performed by: STUDENT IN AN ORGANIZED HEALTH CARE EDUCATION/TRAINING PROGRAM

## 2025-01-17 PROCEDURE — 1160F RVW MEDS BY RX/DR IN RCRD: CPT | Performed by: STUDENT IN AN ORGANIZED HEALTH CARE EDUCATION/TRAINING PROGRAM

## 2025-01-17 PROCEDURE — 1126F AMNT PAIN NOTED NONE PRSNT: CPT | Performed by: STUDENT IN AN ORGANIZED HEALTH CARE EDUCATION/TRAINING PROGRAM

## 2025-01-17 NOTE — PROGRESS NOTES
"Chief Complaint  Chief Complaint   Patient presents with    Hypertension     6 month follow up     Subjective        Tin Byrne is a 84 y.o. male who presents to [facility].    History of Present Illness  Here for 6 month f/u of chronic conditions.    HTN on amlodipine 5 mg daily, lisinopril 20 mg daily.  HLD on pravastatin 20 mg daily.  CAD on asa 81 mg daily.      Sees Dr. Nolan (cardiology).  Sees Dr. Hernandez (ENT).    [facility]     [facility]    A1C checked Sept 2024 and was 6.1.      Objective   /74   Pulse 85   Resp 18   Ht 177.8 cm (70\")   Wt 101 kg (223 lb 6.4 oz)   SpO2 99%   BMI 32.05 kg/m²     Estimated body mass index is 32.05 kg/m² as calculated from the following:    Height as of this encounter: 177.8 cm (70\").    Weight as of this encounter: 101 kg (223 lb 6.4 oz).     Physical Exam   GEN: In no acute distress, non toxic appearing  HEENT: Bilateral TM's obstructed w/ cerumen.    CV: Regular rate and rhythm, no murmurs, 2+ peripheral pulses  RESP: Lungs clear to auscultation anteriorly and posteriorly in all lung fields bilaterally.  NEURO: AAO to person, place, and time. CN 2-12 intact grossly.  PSYCH: Affect normal, insight fair     PHQ-2 Depression Screening  Little interest or pleasure in doing things? Not at all   Feeling down, depressed, or hopeless? Not at all   PHQ-2 Total Score 0      Result Review :              Assessment and Plan     Diagnoses and all orders for this visit:    1. Primary hypertension (Primary)  Blood pressure at goal today, continue amlodipine 5 mg daily, lisinopril 20 mg daily.    2. Hyperlipidemia, unspecified hyperlipidemia type  [facility], reassuring.  Continue pravastatin 20 mg daily.    3. Coronary artery disease involving native coronary artery of native heart without angina pectoris  Continue low-dose aspirin daily.  Keep follow-up with " cardiology.       Follow Up     Return in about 6 months (around 7/17/2025) for Medicare Wellness.

## 2025-02-28 ENCOUNTER — OFFICE VISIT (OUTPATIENT)
Dept: CARDIOLOGY | Facility: CLINIC | Age: 85
End: 2025-02-28
Payer: MEDICARE

## 2025-02-28 VITALS
HEART RATE: 81 BPM | WEIGHT: 217 LBS | SYSTOLIC BLOOD PRESSURE: 132 MMHG | HEIGHT: 70 IN | BODY MASS INDEX: 31.07 KG/M2 | DIASTOLIC BLOOD PRESSURE: 78 MMHG

## 2025-02-28 DIAGNOSIS — I48.0 PAROXYSMAL ATRIAL FIBRILLATION: Primary | ICD-10-CM

## 2025-02-28 DIAGNOSIS — R00.2 PALPITATIONS: ICD-10-CM

## 2025-02-28 DIAGNOSIS — E78.2 MIXED HYPERLIPIDEMIA: ICD-10-CM

## 2025-02-28 DIAGNOSIS — I10 ESSENTIAL HYPERTENSION: ICD-10-CM

## 2025-02-28 DIAGNOSIS — I25.10 CORONARY ARTERY DISEASE INVOLVING NATIVE CORONARY ARTERY OF NATIVE HEART WITHOUT ANGINA PECTORIS: ICD-10-CM

## 2025-02-28 PROCEDURE — 1159F MED LIST DOCD IN RCRD: CPT | Performed by: INTERNAL MEDICINE

## 2025-02-28 PROCEDURE — 3075F SYST BP GE 130 - 139MM HG: CPT | Performed by: INTERNAL MEDICINE

## 2025-02-28 PROCEDURE — 3078F DIAST BP <80 MM HG: CPT | Performed by: INTERNAL MEDICINE

## 2025-02-28 PROCEDURE — 93000 ELECTROCARDIOGRAM COMPLETE: CPT | Performed by: INTERNAL MEDICINE

## 2025-02-28 PROCEDURE — 99213 OFFICE O/P EST LOW 20 MIN: CPT | Performed by: INTERNAL MEDICINE

## 2025-02-28 PROCEDURE — 1160F RVW MEDS BY RX/DR IN RCRD: CPT | Performed by: INTERNAL MEDICINE

## 2025-02-28 NOTE — PROGRESS NOTES
C--Atrial fibrillation, hypertension    SUB--84-year-old male patient has atrial fibrillation and history of hypertension and comes in for follow-up  Patient had AF ablation in 2017 and a redo ablation in 2022.  Post second ablation patient had iatrogenic AV fistula needing surgical repair and has prior history of less than 50% stenosis of the carotid.  Patient had prior bypass surgery with left atrial appendage ligation and a stress test in 2022 without ischemia with normal EF.  Patient is intolerant to Aldactone in the past          Past Medical History:     Reviewed history from 01/24/2017 and no changes required:        Hyperlipidemia        Hypertension        Obesity        Arthritis        Peripheral vascular disease - < 50% stenoses of the carotids        Atrial fibrillation-Radioablation 12/2016        Prostate biopsy - 2012 - benign        GERD        Coronary artery disease    Past Surgical History:     Reviewed history from 01/16/2017 and no changes required:        Nasal reconstruction        Prostate biopsy(2010)        Parathyroid adenma removed.         Cardioversion - 1/2012; 10/5/2016        CABG - 07/2014        A-flutter ablation 12/2016 [facility]           Physical Exam    General:      well developed, well nourished, in no acute distress.    Head:      normocephalic and atraumatic.    Eyes:      PERRL/EOM intact, conjunctivae and sclerae clear without nystagmus.    Neck:      no  thyromegaly, trachea central with normal respiratory effort  Lungs:      clear bilaterally to auscultation.    Heart:       regular rate and rhythm, S1, S2 without murmurs, rubs, or gallops  Skin:      intact without lesions or rashes.    Psych:      alert and cooperative; normal mood and affect; normal attention span and concentration.            ASSESSMENT PLAN  Post redo AF ablation currently in sinus rhythm with prior left atrial appendage ligation on aspirin  Hypertension controlled with lisinopril and  amlodipine  Hyperlipidemia on pravastatin  Presence of PFO on aspirin  Coronary artery disease stable without angina on aspirin and pravastatin  Medications reviewed and follow-up appointments made  Most recent labs include creatinine of 1, potassium normal, LDL 72 and TSH normal, hemoglobin platelets normal      ECG 12 Lead    Date/Time: 2/28/2025 10:53 AM  Performed by: Mark Nolan MD    Authorized by: Mark Nolan MD  Comparison: compared with previous ECG   Similar to previous ECG  Rhythm: sinus rhythm  Rate: normal  Conduction: 1st degree AV block      Electronically signed by Mark Nolan MD, 02/28/25, 10:53 AM EST.

## 2025-02-28 NOTE — LETTER
February 28, 2025     Jerson Renner DO  [address]  [place] [address]    Patient: Tin Byrne   YOB: 1940   Date of Visit: 2/28/2025     Dear Jerson Renner DO:       Thank you for referring Tin Byrne to me for evaluation. Below are the relevant portions of my assessment and plan of care.    If you have questions, please do not hesitate to call me. I look forward to following Tin along with you.         Sincerely,        Mark Nolan MD        CC: No Recipients    Mark Nolan MD  02/28/25 1054  Sign when Signing Visit  C--Atrial fibrillation, hypertension    SUB--84-year-old male patient has atrial fibrillation and history of hypertension and comes in for follow-up  Patient had AF ablation in 2017 and a redo ablation in 2022.  Post second ablation patient had iatrogenic AV fistula needing surgical repair and has prior history of less than 50% stenosis of the carotid.  Patient had prior bypass surgery with left atrial appendage ligation and a stress test in 2022 without ischemia with normal EF.  Patient is intolerant to Aldactone in the past          Past Medical History:     Reviewed history from 01/24/2017 and no changes required:        Hyperlipidemia        Hypertension        Obesity        Arthritis        Peripheral vascular disease - < 50% stenoses of the carotids        Atrial fibrillation-Radioablation 12/2016        Prostate biopsy - 2012 - benign        GERD        Coronary artery disease    Past Surgical History:     Reviewed history from 01/16/2017 and no changes required:        Nasal reconstruction        Prostate biopsy(2010)        Parathyroid adenma removed.         Cardioversion - 1/2012; 10/5/2016        CABG - 07/2014        A-flutter ablation 12/2016 [facility]           Physical Exam    General:      well developed, well nourished, in no acute distress.    Head:      normocephalic and atraumatic.    Eyes:       PERRL/EOM intact, conjunctivae and sclerae clear without nystagmus.    Neck:      no  thyromegaly, trachea central with normal respiratory effort  Lungs:      clear bilaterally to auscultation.    Heart:       regular rate and rhythm, S1, S2 without murmurs, rubs, or gallops  Skin:      intact without lesions or rashes.    Psych:      alert and cooperative; normal mood and affect; normal attention span and concentration.            ASSESSMENT PLAN  Post redo AF ablation currently in sinus rhythm with prior left atrial appendage ligation on aspirin  Hypertension controlled with lisinopril and amlodipine  Hyperlipidemia on pravastatin  Presence of PFO on aspirin  Coronary artery disease stable without angina on aspirin and pravastatin  Medications reviewed and follow-up appointments made  Most recent labs include creatinine of 1, potassium normal, LDL 72 and TSH normal, hemoglobin platelets normal      ECG 12 Lead    Date/Time: 2/28/2025 10:53 AM  Performed by: Mark Nolan MD    Authorized by: Mark Nolan MD  Comparison: compared with previous ECG   Similar to previous ECG  Rhythm: sinus rhythm  Rate: normal  Conduction: 1st degree AV block      Electronically signed by Mark Nolan MD, 02/28/25, 10:53 AM EST.

## 2025-07-08 ENCOUNTER — TELEPHONE (OUTPATIENT)
Dept: CARDIOLOGY | Facility: CLINIC | Age: 85
End: 2025-07-08
Payer: MEDICARE

## 2025-07-08 NOTE — TELEPHONE ENCOUNTER
"  Caller: Tin Byrne \"AFIA\"    Relationship: Self    Best call back number:       PATIENT CALLED TO ADVISE THAT FOR ABOUT 2 WEEKS NOW HE HAS BEEN OUT OF RHYTHM,   "

## 2025-07-18 ENCOUNTER — OFFICE VISIT (OUTPATIENT)
Dept: CARDIOLOGY | Facility: CLINIC | Age: 85
End: 2025-07-18
Payer: MEDICARE

## 2025-07-18 VITALS
HEART RATE: 70 BPM | HEIGHT: 70 IN | OXYGEN SATURATION: 98 % | SYSTOLIC BLOOD PRESSURE: 136 MMHG | BODY MASS INDEX: 32.21 KG/M2 | WEIGHT: 225 LBS | DIASTOLIC BLOOD PRESSURE: 80 MMHG

## 2025-07-18 DIAGNOSIS — I48.0 PAROXYSMAL ATRIAL FIBRILLATION: Primary | ICD-10-CM

## 2025-07-18 DIAGNOSIS — R00.2 PALPITATIONS: ICD-10-CM

## 2025-07-18 DIAGNOSIS — Z86.79 STATUS POST ABLATION OF ATRIAL FIBRILLATION: ICD-10-CM

## 2025-07-18 DIAGNOSIS — E78.2 MIXED HYPERLIPIDEMIA: ICD-10-CM

## 2025-07-18 DIAGNOSIS — Z98.890 STATUS POST ABLATION OF ATRIAL FIBRILLATION: ICD-10-CM

## 2025-07-18 DIAGNOSIS — I10 ESSENTIAL HYPERTENSION: ICD-10-CM

## 2025-07-18 DIAGNOSIS — I25.10 CORONARY ARTERY DISEASE INVOLVING NATIVE CORONARY ARTERY OF NATIVE HEART WITHOUT ANGINA PECTORIS: ICD-10-CM

## 2025-07-18 PROCEDURE — 1159F MED LIST DOCD IN RCRD: CPT | Performed by: INTERNAL MEDICINE

## 2025-07-18 PROCEDURE — 99214 OFFICE O/P EST MOD 30 MIN: CPT | Performed by: INTERNAL MEDICINE

## 2025-07-18 PROCEDURE — 3075F SYST BP GE 130 - 139MM HG: CPT | Performed by: INTERNAL MEDICINE

## 2025-07-18 PROCEDURE — 93000 ELECTROCARDIOGRAM COMPLETE: CPT | Performed by: INTERNAL MEDICINE

## 2025-07-18 PROCEDURE — 3079F DIAST BP 80-89 MM HG: CPT | Performed by: INTERNAL MEDICINE

## 2025-07-18 PROCEDURE — 1160F RVW MEDS BY RX/DR IN RCRD: CPT | Performed by: INTERNAL MEDICINE

## 2025-07-18 NOTE — LETTER
July 18, 2025     Jerson Renner DO  [address]  [place] [address]    Patient: Tin Byrne   YOB: 1940   Date of Visit: 7/18/2025     Dear Jerson Renner DO:       Thank you for referring Tin Byrne to me for evaluation. Below are the relevant portions of my assessment and plan of care.    If you have questions, please do not hesitate to call me. I look forward to following Tin along with you.         Sincerely,        Mark Nolan MD        CC: No Recipients    Mark Nolan MD  07/18/25 1129  Sign when Signing Visit  C--Atrial fibrillation, hypertension    SUB--84-year-old male patient well-known to me comes in for follow-up   patient has history of atrial arrhythmias and hypertension      My previous history attached below for reference    84-year-old male patient has atrial fibrillation and history of hypertension and comes in for follow-up  Patient had AF ablation in 2017 and a redo ablation in 2022.  Post second ablation patient had iatrogenic AV fistula needing surgical repair and has prior history of less than 50% stenosis of the carotid.  Patient had prior bypass surgery with left atrial appendage ligation and a stress test in 2022 without ischemia with normal EF.  Patient is intolerant to Aldactone in the past          Past Medical History:     Reviewed history from 01/24/2017 and no changes required:        Hyperlipidemia        Hypertension        Obesity        Arthritis        Peripheral vascular disease - < 50% stenoses of the carotids        Atrial fibrillation-Radioablation 12/2016        Prostate biopsy - 2012 - benign        GERD        Coronary artery disease    Past Surgical History:     Reviewed history from 01/16/2017 and no changes required:        Nasal reconstruction        Prostate biopsy(2010)        Parathyroid adenma removed.         Cardioversion - 1/2012; 10/5/2016        CABG - 07/2014         A-flutter ablation 12/2016 [facility]           Physical Exam    General:      well developed, well nourished, in no acute distress.    Head:      normocephalic and atraumatic.    Eyes:      PERRL/EOM intact, conjunctivae and sclerae clear without nystagmus.    Neck:      no  thyromegaly, trachea central with normal respiratory effort  Lungs:      clear bilaterally to auscultation.    Heart:       regular rate and rhythm, S1, S2 without murmurs, rubs, or gallops  Skin:      intact without lesions or rashes.    Psych:      alert and cooperative; normal mood and affect; normal attention span and concentration.          ASSESSMENT PLAN  Prior history of recurrent AF post redo ablation currently in sinus rhythm with prior left atrial appendage ligation on aspirin  First-degree AV block and EKG today reveals intermittent Mobitz type I block without any symptoms--- patient feels like it is having A-fib with palpitations however patient had progressive conduction system disease from first-degree AV block to Mobitz type I block with intermittent PVCs without any symptoms and patient was educated.  48-hour Holter monitor was recommended to evaluate for any recurrent arrhythmia or any significant bradycardia and reevaluate this patient in 3 months  History of PFO on aspirin  Dyslipidemia on pravastatin  Coronary artery disease stable without angina on aspirin and pravastatin  Hypertension controlled with lisinopril and amlodipine  Medications reviewed and follow-up appointments made  48-hour Holter ordered        ECG 12 Lead    Date/Time: 7/18/2025 11:29 AM  Performed by: Mark Nolan MD    Authorized by: Mark Nolan MD  Comparison: compared with previous ECG   Comparison to previous ECG: Sinus rhythm with Mobitz type I block with PVCs and compared to previous EKG Mobitz type I block is new with previous EKG showing first-degree AV block      Electronically signed by Mark Nolan MD, 07/18/25, 11:29 AM EDT.

## 2025-07-18 NOTE — PROGRESS NOTES
C--Atrial fibrillation, hypertension    SUB--84-year-old male patient well-known to me comes in for follow-up   patient has history of atrial arrhythmias and hypertension      My previous history attached below for reference    84-year-old male patient has atrial fibrillation and history of hypertension and comes in for follow-up  Patient had AF ablation in 2017 and a redo ablation in 2022.  Post second ablation patient had iatrogenic AV fistula needing surgical repair and has prior history of less than 50% stenosis of the carotid.  Patient had prior bypass surgery with left atrial appendage ligation and a stress test in 2022 without ischemia with normal EF.  Patient is intolerant to Aldactone in the past          Past Medical History:     Reviewed history from 01/24/2017 and no changes required:        Hyperlipidemia        Hypertension        Obesity        Arthritis        Peripheral vascular disease - < 50% stenoses of the carotids        Atrial fibrillation-Radioablation 12/2016        Prostate biopsy - 2012 - benign        GERD        Coronary artery disease    Past Surgical History:     Reviewed history from 01/16/2017 and no changes required:        Nasal reconstruction        Prostate biopsy(2010)        Parathyroid adenma removed.         Cardioversion - 1/2012; 10/5/2016        CABG - 07/2014        A-flutter ablation 12/2016 [facility]           Physical Exam    General:      well developed, well nourished, in no acute distress.    Head:      normocephalic and atraumatic.    Eyes:      PERRL/EOM intact, conjunctivae and sclerae clear without nystagmus.    Neck:      no  thyromegaly, trachea central with normal respiratory effort  Lungs:      clear bilaterally to auscultation.    Heart:       regular rate and rhythm, S1, S2 without murmurs, rubs, or gallops  Skin:      intact without lesions or rashes.    Psych:      alert and cooperative; normal mood and affect; normal attention span and concentration.           ASSESSMENT PLAN  Prior history of recurrent AF post redo ablation currently in sinus rhythm with prior left atrial appendage ligation on aspirin  First-degree AV block and EKG today reveals intermittent Mobitz type I block without any symptoms--- patient feels like it is having A-fib with palpitations however patient had progressive conduction system disease from first-degree AV block to Mobitz type I block with intermittent PVCs without any symptoms and patient was educated.  48-hour Holter monitor was recommended to evaluate for any recurrent arrhythmia or any significant bradycardia and reevaluate this patient in 3 months  History of PFO on aspirin  Dyslipidemia on pravastatin  Coronary artery disease stable without angina on aspirin and pravastatin  Hypertension controlled with lisinopril and amlodipine  Medications reviewed and follow-up appointments made  48-hour Holter ordered        ECG 12 Lead    Date/Time: 7/18/2025 11:29 AM  Performed by: Mark Nolan MD    Authorized by: Mark Nolan MD  Comparison: compared with previous ECG   Comparison to previous ECG: Sinus rhythm with Mobitz type I block with PVCs and compared to previous EKG Mobitz type I block is new with previous EKG showing first-degree AV block      Electronically signed by Mark Nolan MD, 07/18/25, 11:29 AM EDT.

## 2025-07-22 ENCOUNTER — HOSPITAL ENCOUNTER (OUTPATIENT)
Dept: RESPIRATORY THERAPY | Facility: HOSPITAL | Age: 85
Discharge: HOME | End: 2025-07-22
Admitting: INTERNAL MEDICINE
Payer: MEDICARE

## 2025-07-22 DIAGNOSIS — I48.0 PAROXYSMAL ATRIAL FIBRILLATION: ICD-10-CM

## 2025-07-22 DIAGNOSIS — R00.2 PALPITATIONS: ICD-10-CM

## 2025-07-22 PROCEDURE — 93242 EXT ECG>48HR<7D RECORDING: CPT

## 2025-07-23 ENCOUNTER — TELEPHONE (OUTPATIENT)
Dept: CARDIOLOGY | Facility: CLINIC | Age: 85
End: 2025-07-23

## 2025-08-01 LAB
CV ZIO AF AVG BPM: 47 BPM
CV ZIO AF BPM HIGH: 76 BPM
CV ZIO AF BPM LOW: 31 BPM
CV ZIO AF F EPI AVG BPM: 46 BPM
CV ZIO AF F EPI BPM HIGH: 76 BPM
CV ZIO AF F EPI BPM LOW: 31 BPM
CV ZIO AF F EPI DT: NORMAL
CV ZIO AF L EPI AVG BPM: 46 BPM
CV ZIO AF L EPI BPM HIGH: 76 BPM
CV ZIO AF L EPI BPM LOW: 31 BPM
CV ZIO AF L EPI DUR: 5429.8 SEC
CV ZIO AF L EPI END: NORMAL
CV ZIO AF L EPI START: NORMAL
CV ZIO AF PERCENT: 4 %
CV ZIO AF S EPI AVG BPM: 46 BPM
CV ZIO AF S EPI BPM HIGH: 76 BPM
CV ZIO AF S EPI BPM LOW: 31 BPM
CV ZIO AF S EPI DT: NORMAL
CV ZIO BASELINE AVG BPM: 68 BPM
CV ZIO BASELINE BPM HIGH: 235 BPM
CV ZIO BASELINE BPM LOW: 25 BPM
CV ZIO DEVICE ANALYSIS TIME: NORMAL
CV ZIO ECT SVE COUNT: 9128 EPISODES
CV ZIO ECT SVE CPLT COUNT: 991 EPISODES
CV ZIO ECT SVE CPLT FREQ: NORMAL
CV ZIO ECT SVE FREQ: NORMAL
CV ZIO ECT SVE TPLT COUNT: 259 EPISODES
CV ZIO ECT SVE TPLT FREQ: NORMAL
CV ZIO ECT VE COUNT: NORMAL EPISODES
CV ZIO ECT VE CPLT COUNT: 1146 EPISODES
CV ZIO ECT VE CPLT FREQ: NORMAL
CV ZIO ECT VE FREQ: NORMAL
CV ZIO ECT VE TPLT COUNT: 66 EPISODES
CV ZIO ECT VE TPLT FREQ: NORMAL
CV ZIO ECTOPIC SVE COUPLET RAW PERCENT: 0.67 %
CV ZIO ECTOPIC SVE ISOLATED PERCENT: 3.08 %
CV ZIO ECTOPIC SVE TRIPLET RAW PERCENT: 0.26 %
CV ZIO ECTOPIC VE COUPLET RAW PERCENT: 0.77 %
CV ZIO ECTOPIC VE ISOLATED PERCENT: 11.66 %
CV ZIO ECTOPIC VE TRIPLET RAW PERCENT: 0.07 %
CV ZIO ENROLLMENT END: NORMAL
CV ZIO ENROLLMENT START: NORMAL
CV ZIO IRREG TYPE: NORMAL
CV ZIO L BIGEMINY DUR: 28.2 SEC
CV ZIO L BIGEMINY END: NORMAL
CV ZIO L BIGEMINY START: NORMAL
CV ZIO L TRIGEMINY DUR: 63.8 SEC
CV ZIO L TRIGEMINY END: NORMAL
CV ZIO L TRIGEMINY START: NORMAL
CV ZIO PATIENT EVENTS DIARIES: 0
CV ZIO PATIENT EVENTS TRIGGERS: 0
CV ZIO PAUSE COUNT: 14
CV ZIO PAUSE END: 3.7 SEC
CV ZIO PAUSE START: 3 SEC
CV ZIO PRESCRIPTION STATUS: NORMAL
CV ZIO SVT AVG BPM: 129 BPM
CV ZIO SVT BPM HIGH: 152 BPM
CV ZIO SVT BPM LOW: 90 BPM
CV ZIO SVT COUNT: 69
CV ZIO SVT F EPI AVG BPM: 144 BPM
CV ZIO SVT F EPI BEATS: 32 BEATS
CV ZIO SVT F EPI BPM HIGH: 152 BPM
CV ZIO SVT F EPI BPM LOW: 136 BPM
CV ZIO SVT F EPI DUR: 13 SEC
CV ZIO SVT F EPI END: NORMAL
CV ZIO SVT F EPI START: NORMAL
CV ZIO SVT L EPI AVG BPM: 139 BPM
CV ZIO SVT L EPI BEATS: 45 BEATS
CV ZIO SVT L EPI BPM HIGH: 148 BPM
CV ZIO SVT L EPI BPM LOW: 99 BPM
CV ZIO SVT L EPI DUR: 19.7 SEC
CV ZIO SVT L EPI END: NORMAL
CV ZIO SVT L EPI START: NORMAL
CV ZIO TOTAL  ENROLLMENT PERIOD: NORMAL
CV ZIO VT AVG BPM: 121 BPM
CV ZIO VT BPM HIGH: 235 BPM
CV ZIO VT BPM LOW: 72 BPM
CV ZIO VT COUNT: 12
CV ZIO VT F EPI AVG BPM: 141
CV ZIO VT F EPI BEATS: 6 BEATS
CV ZIO VT F EPI BPM HIGH: 235
CV ZIO VT F EPI BPM LOW: 78
CV ZIO VT F EPI DUR: 2.8 SEC
CV ZIO VT F EPI END: NORMAL
CV ZIO VT F EPI START: NORMAL
CV ZIO VT L EPI AVG BPM: 101
CV ZIO VT L EPI BEATS: 8 BEATS
CV ZIO VT L EPI BPM HIGH: 115 BPM
CV ZIO VT L EPI BPM LOW: 72 BPM
CV ZIO VT L EPI DUR: 4.8
CV ZIO VT L EPI END: NORMAL
CV ZIO VT L EPI START: NORMAL

## 2025-08-05 ENCOUNTER — TELEPHONE (OUTPATIENT)
Dept: CARDIOLOGY | Facility: CLINIC | Age: 85
End: 2025-08-05
Payer: MEDICARE

## 2025-08-05 ENCOUNTER — PREP FOR SURGERY (OUTPATIENT)
Dept: OTHER | Facility: HOSPITAL | Age: 85
End: 2025-08-05
Payer: MEDICARE

## 2025-08-05 DIAGNOSIS — R00.2 PALPITATIONS: ICD-10-CM

## 2025-08-05 DIAGNOSIS — Z86.79 STATUS POST ABLATION OF ATRIAL FIBRILLATION: ICD-10-CM

## 2025-08-05 DIAGNOSIS — I25.10 CORONARY ARTERY DISEASE INVOLVING NATIVE CORONARY ARTERY OF NATIVE HEART WITHOUT ANGINA PECTORIS: ICD-10-CM

## 2025-08-05 DIAGNOSIS — Z98.890 STATUS POST ABLATION OF ATRIAL FIBRILLATION: ICD-10-CM

## 2025-08-05 DIAGNOSIS — I48.0 PAROXYSMAL ATRIAL FIBRILLATION: Primary | ICD-10-CM

## 2025-08-05 DIAGNOSIS — I49.5 SICK SINUS SYNDROME: ICD-10-CM

## 2025-08-05 RX ORDER — SODIUM CHLORIDE 0.9 % (FLUSH) 0.9 %
3-10 SYRINGE (ML) INJECTION AS NEEDED
Status: CANCELLED | OUTPATIENT
Start: 2025-08-05

## 2025-08-05 RX ORDER — SODIUM CHLORIDE 0.9 % (FLUSH) 0.9 %
3 SYRINGE (ML) INJECTION EVERY 12 HOURS SCHEDULED
Status: CANCELLED | OUTPATIENT
Start: 2025-08-05

## 2025-08-05 RX ORDER — SODIUM CHLORIDE 9 MG/ML
40 INJECTION, SOLUTION INTRAVENOUS AS NEEDED
Status: CANCELLED | OUTPATIENT
Start: 2025-08-05

## 2025-08-07 ENCOUNTER — HOSPITAL ENCOUNTER (OUTPATIENT)
Facility: HOSPITAL | Age: 85
Discharge: HOME | End: 2025-08-08
Attending: INTERNAL MEDICINE | Admitting: INTERNAL MEDICINE
Payer: MEDICARE

## 2025-08-07 ENCOUNTER — ANESTHESIA (OUTPATIENT)
Dept: CARDIOLOGY | Facility: HOSPITAL | Age: 85
End: 2025-08-07
Payer: MEDICARE

## 2025-08-07 ENCOUNTER — ANESTHESIA EVENT (OUTPATIENT)
Dept: CARDIOLOGY | Facility: HOSPITAL | Age: 85
End: 2025-08-07
Payer: MEDICARE

## 2025-08-07 ENCOUNTER — APPOINTMENT (OUTPATIENT)
Dept: GENERAL RADIOLOGY | Facility: HOSPITAL | Age: 85
End: 2025-08-07
Payer: MEDICARE

## 2025-08-07 DIAGNOSIS — Z98.890 STATUS POST ABLATION OF ATRIAL FIBRILLATION: ICD-10-CM

## 2025-08-07 DIAGNOSIS — Z95.0 PRESENCE OF CARDIAC PACEMAKER: ICD-10-CM

## 2025-08-07 DIAGNOSIS — I49.5 SICK SINUS SYNDROME: Primary | ICD-10-CM

## 2025-08-07 DIAGNOSIS — Z86.79 STATUS POST ABLATION OF ATRIAL FIBRILLATION: ICD-10-CM

## 2025-08-07 DIAGNOSIS — R00.2 PALPITATIONS: ICD-10-CM

## 2025-08-07 DIAGNOSIS — I48.0 PAROXYSMAL ATRIAL FIBRILLATION: ICD-10-CM

## 2025-08-07 DIAGNOSIS — I25.10 CORONARY ARTERY DISEASE INVOLVING NATIVE CORONARY ARTERY OF NATIVE HEART WITHOUT ANGINA PECTORIS: ICD-10-CM

## 2025-08-07 LAB
ALBUMIN SERPL-MCNC: 3.9 G/DL (ref 3.5–5.2)
ALBUMIN/GLOB SERPL: 1.6 G/DL
ALP SERPL-CCNC: 48 U/L (ref 39–117)
ALT SERPL W P-5'-P-CCNC: 16 U/L (ref 1–41)
ANION GAP SERPL CALCULATED.3IONS-SCNC: 10.5 MMOL/L (ref 5–15)
ANION GAP SERPL CALCULATED.3IONS-SCNC: 9.7 MMOL/L (ref 5–15)
AST SERPL-CCNC: 20 U/L (ref 1–40)
BASOPHILS # BLD AUTO: 0.02 10*3/MM3 (ref 0–0.2)
BASOPHILS NFR BLD AUTO: 0.3 % (ref 0–1.5)
BILIRUB SERPL-MCNC: 0.6 MG/DL (ref 0–1.2)
BUN SERPL-MCNC: 14.7 MG/DL (ref 8–23)
BUN SERPL-MCNC: 18.2 MG/DL (ref 8–23)
BUN/CREAT SERPL: 14 (ref 7–25)
BUN/CREAT SERPL: 17.2 (ref 7–25)
CALCIUM SPEC-SCNC: 8.7 MG/DL (ref 8.6–10.5)
CALCIUM SPEC-SCNC: 9.2 MG/DL (ref 8.6–10.5)
CHLORIDE SERPL-SCNC: 107 MMOL/L (ref 98–107)
CHLORIDE SERPL-SCNC: 107 MMOL/L (ref 98–107)
CO2 SERPL-SCNC: 22.5 MMOL/L (ref 22–29)
CO2 SERPL-SCNC: 23.3 MMOL/L (ref 22–29)
CREAT SERPL-MCNC: 1.05 MG/DL (ref 0.76–1.27)
CREAT SERPL-MCNC: 1.06 MG/DL (ref 0.76–1.27)
DEPRECATED RDW RBC AUTO: 47.6 FL (ref 37–54)
EGFRCR SERPLBLD CKD-EPI 2021: 68.8 ML/MIN/1.73
EGFRCR SERPLBLD CKD-EPI 2021: 69.6 ML/MIN/1.73
EOSINOPHIL # BLD AUTO: 0.21 10*3/MM3 (ref 0–0.4)
EOSINOPHIL NFR BLD AUTO: 3.5 % (ref 0.3–6.2)
ERYTHROCYTE [DISTWIDTH] IN BLOOD BY AUTOMATED COUNT: 13.9 % (ref 12.3–15.4)
GLOBULIN UR ELPH-MCNC: 2.5 GM/DL
GLUCOSE SERPL-MCNC: 113 MG/DL (ref 65–99)
GLUCOSE SERPL-MCNC: 129 MG/DL (ref 65–99)
HCT VFR BLD AUTO: 43.4 % (ref 37.5–51)
HGB BLD-MCNC: 14.3 G/DL (ref 13–17.7)
IMM GRANULOCYTES # BLD AUTO: 0 10*3/MM3 (ref 0–0.05)
IMM GRANULOCYTES NFR BLD AUTO: 0 % (ref 0–0.5)
LYMPHOCYTES # BLD AUTO: 1.15 10*3/MM3 (ref 0.7–3.1)
LYMPHOCYTES NFR BLD AUTO: 19.2 % (ref 19.6–45.3)
MAGNESIUM SERPL-MCNC: 2.3 MG/DL (ref 1.6–2.4)
MAGNESIUM SERPL-MCNC: 2.3 MG/DL (ref 1.6–2.4)
MCH RBC QN AUTO: 30.2 PG (ref 26.6–33)
MCHC RBC AUTO-ENTMCNC: 32.9 G/DL (ref 31.5–35.7)
MCV RBC AUTO: 91.8 FL (ref 79–97)
MONOCYTES # BLD AUTO: 0.6 10*3/MM3 (ref 0.1–0.9)
MONOCYTES NFR BLD AUTO: 10 % (ref 5–12)
NEUTROPHILS NFR BLD AUTO: 4 10*3/MM3 (ref 1.7–7)
NEUTROPHILS NFR BLD AUTO: 67 % (ref 42.7–76)
NRBC BLD AUTO-RTO: 0 /100 WBC (ref 0–0.2)
PLATELET # BLD AUTO: 133 10*3/MM3 (ref 140–450)
PMV BLD AUTO: 12.1 FL (ref 6–12)
POTASSIUM SERPL-SCNC: 3.9 MMOL/L (ref 3.5–5.2)
POTASSIUM SERPL-SCNC: 4.1 MMOL/L (ref 3.5–5.2)
PROT SERPL-MCNC: 6.4 G/DL (ref 6–8.5)
RBC # BLD AUTO: 4.73 10*6/MM3 (ref 4.14–5.8)
SODIUM SERPL-SCNC: 140 MMOL/L (ref 136–145)
SODIUM SERPL-SCNC: 140 MMOL/L (ref 136–145)
WBC NRBC COR # BLD AUTO: 5.98 10*3/MM3 (ref 3.4–10.8)

## 2025-08-07 PROCEDURE — 25010000002 PHENYLEPHRINE 10 MG/ML SOLUTION 5 ML VIAL: Performed by: REGISTERED NURSE

## 2025-08-07 PROCEDURE — 80053 COMPREHEN METABOLIC PANEL: CPT | Performed by: INTERNAL MEDICINE

## 2025-08-07 PROCEDURE — 25510000001 IOPAMIDOL PER 1 ML: Performed by: INTERNAL MEDICINE

## 2025-08-07 PROCEDURE — 25010000002 PROPOFOL 1000 MG/100ML EMULSION: Performed by: REGISTERED NURSE

## 2025-08-07 PROCEDURE — C1898 LEAD, PMKR, OTHER THAN TRANS: HCPCS | Performed by: INTERNAL MEDICINE

## 2025-08-07 PROCEDURE — 25010000002 LIDOCAINE PF 2% 2 % SOLUTION: Performed by: REGISTERED NURSE

## 2025-08-07 PROCEDURE — 71045 X-RAY EXAM CHEST 1 VIEW: CPT

## 2025-08-07 PROCEDURE — C1785 PMKR, DUAL, RATE-RESP: HCPCS | Performed by: INTERNAL MEDICINE

## 2025-08-07 PROCEDURE — 25810000003 SODIUM CHLORIDE 0.9 % SOLUTION: Performed by: REGISTERED NURSE

## 2025-08-07 PROCEDURE — 25010000002 CEFAZOLIN PER 500 MG: Performed by: INTERNAL MEDICINE

## 2025-08-07 PROCEDURE — 93010 ELECTROCARDIOGRAM REPORT: CPT | Performed by: INTERNAL MEDICINE

## 2025-08-07 PROCEDURE — 83735 ASSAY OF MAGNESIUM: CPT | Performed by: INTERNAL MEDICINE

## 2025-08-07 PROCEDURE — 25010000002 PROPOFOL 10 MG/ML EMULSION: Performed by: REGISTERED NURSE

## 2025-08-07 PROCEDURE — C1769 GUIDE WIRE: HCPCS | Performed by: INTERNAL MEDICINE

## 2025-08-07 PROCEDURE — A9270 NON-COVERED ITEM OR SERVICE: HCPCS | Performed by: INTERNAL MEDICINE

## 2025-08-07 PROCEDURE — 25810000003 SODIUM CHLORIDE 0.9 % SOLUTION 250 ML FLEX CONT: Performed by: REGISTERED NURSE

## 2025-08-07 PROCEDURE — 93005 ELECTROCARDIOGRAM TRACING: CPT | Performed by: INTERNAL MEDICINE

## 2025-08-07 PROCEDURE — C1894 INTRO/SHEATH, NON-LASER: HCPCS | Performed by: INTERNAL MEDICINE

## 2025-08-07 PROCEDURE — G0378 HOSPITAL OBSERVATION PER HR: HCPCS

## 2025-08-07 PROCEDURE — 25010000002 LIDOCAINE-EPINEPHRINE 2 %-1:100000 SOLUTION: Performed by: INTERNAL MEDICINE

## 2025-08-07 PROCEDURE — 85025 COMPLETE CBC W/AUTO DIFF WBC: CPT | Performed by: INTERNAL MEDICINE

## 2025-08-07 PROCEDURE — 63710000001 DILTIAZEM CD 120 MG CAPSULE SUSTAINED-RELEASE 24 HR: Performed by: INTERNAL MEDICINE

## 2025-08-07 PROCEDURE — 63710000001 LISINOPRIL 5 MG TABLET: Performed by: INTERNAL MEDICINE

## 2025-08-07 PROCEDURE — 33208 INSRT HEART PM ATRIAL & VENT: CPT | Performed by: INTERNAL MEDICINE

## 2025-08-07 PROCEDURE — 25010000002 FENTANYL CITRATE (PF) 100 MCG/2ML SOLUTION: Performed by: REGISTERED NURSE

## 2025-08-07 DEVICE — PACEMAKER
Type: IMPLANTABLE DEVICE | Status: FUNCTIONAL
Brand: ACCOLADE™ MRI EL DR

## 2025-08-07 DEVICE — PACE/SENSE LEAD
Type: IMPLANTABLE DEVICE | Status: FUNCTIONAL
Brand: INGEVITY™+

## 2025-08-07 DEVICE — PACE/SENSE LEAD
Type: IMPLANTABLE DEVICE | Site: HEART | Status: FUNCTIONAL
Brand: INGEVITY™+

## 2025-08-07 RX ORDER — SODIUM CHLORIDE 0.9 % (FLUSH) 0.9 %
3 SYRINGE (ML) INJECTION EVERY 12 HOURS SCHEDULED
Status: DISCONTINUED | OUTPATIENT
Start: 2025-08-07 | End: 2025-08-07 | Stop reason: HOSPADM

## 2025-08-07 RX ORDER — HYDROCODONE BITARTRATE AND ACETAMINOPHEN 5; 325 MG/1; MG/1
1 TABLET ORAL EVERY 6 HOURS PRN
Status: DISCONTINUED | OUTPATIENT
Start: 2025-08-07 | End: 2025-08-08 | Stop reason: HOSPADM

## 2025-08-07 RX ORDER — ACETAMINOPHEN 650 MG/1
650 SUPPOSITORY RECTAL EVERY 8 HOURS
Status: DISCONTINUED | OUTPATIENT
Start: 2025-08-07 | End: 2025-08-08 | Stop reason: HOSPADM

## 2025-08-07 RX ORDER — IPRATROPIUM BROMIDE AND ALBUTEROL SULFATE 2.5; .5 MG/3ML; MG/3ML
3 SOLUTION RESPIRATORY (INHALATION) ONCE AS NEEDED
Status: DISCONTINUED | OUTPATIENT
Start: 2025-08-07 | End: 2025-08-08

## 2025-08-07 RX ORDER — HYDROMORPHONE HYDROCHLORIDE 1 MG/ML
0.5 INJECTION, SOLUTION INTRAMUSCULAR; INTRAVENOUS; SUBCUTANEOUS
Status: DISCONTINUED | OUTPATIENT
Start: 2025-08-07 | End: 2025-08-08

## 2025-08-07 RX ORDER — FENTANYL CITRATE 50 UG/ML
50 INJECTION, SOLUTION INTRAMUSCULAR; INTRAVENOUS
Status: DISCONTINUED | OUTPATIENT
Start: 2025-08-07 | End: 2025-08-08

## 2025-08-07 RX ORDER — SODIUM CHLORIDE 9 MG/ML
INJECTION, SOLUTION INTRAVENOUS CONTINUOUS PRN
Status: DISCONTINUED | OUTPATIENT
Start: 2025-08-07 | End: 2025-08-07 | Stop reason: SURG

## 2025-08-07 RX ORDER — FENTANYL CITRATE 50 UG/ML
INJECTION, SOLUTION INTRAMUSCULAR; INTRAVENOUS AS NEEDED
Status: DISCONTINUED | OUTPATIENT
Start: 2025-08-07 | End: 2025-08-07 | Stop reason: SURG

## 2025-08-07 RX ORDER — ACETAMINOPHEN 160 MG/5ML
650 SOLUTION ORAL EVERY 8 HOURS
Status: DISCONTINUED | OUTPATIENT
Start: 2025-08-07 | End: 2025-08-08 | Stop reason: HOSPADM

## 2025-08-07 RX ORDER — IOPAMIDOL 755 MG/ML
INJECTION, SOLUTION INTRAVASCULAR
Status: DISCONTINUED | OUTPATIENT
Start: 2025-08-07 | End: 2025-08-07 | Stop reason: HOSPADM

## 2025-08-07 RX ORDER — PROPOFOL 10 MG/ML
VIAL (ML) INTRAVENOUS AS NEEDED
Status: DISCONTINUED | OUTPATIENT
Start: 2025-08-07 | End: 2025-08-07 | Stop reason: SURG

## 2025-08-07 RX ORDER — LABETALOL HYDROCHLORIDE 5 MG/ML
5 INJECTION, SOLUTION INTRAVENOUS
Status: DISCONTINUED | OUTPATIENT
Start: 2025-08-07 | End: 2025-08-08

## 2025-08-07 RX ORDER — LISINOPRIL 5 MG/1
10 TABLET ORAL DAILY
Status: DISCONTINUED | OUTPATIENT
Start: 2025-08-07 | End: 2025-08-08 | Stop reason: HOSPADM

## 2025-08-07 RX ORDER — LIDOCAINE HYDROCHLORIDE AND EPINEPHRINE BITARTRATE 20; .01 MG/ML; MG/ML
INJECTION, SOLUTION SUBCUTANEOUS
Status: DISCONTINUED | OUTPATIENT
Start: 2025-08-07 | End: 2025-08-07 | Stop reason: HOSPADM

## 2025-08-07 RX ORDER — DIPHENHYDRAMINE HYDROCHLORIDE 50 MG/ML
12.5 INJECTION, SOLUTION INTRAMUSCULAR; INTRAVENOUS
Status: DISCONTINUED | OUTPATIENT
Start: 2025-08-07 | End: 2025-08-08

## 2025-08-07 RX ORDER — LIDOCAINE HYDROCHLORIDE 20 MG/ML
INJECTION, SOLUTION EPIDURAL; INFILTRATION; INTRACAUDAL; PERINEURAL AS NEEDED
Status: DISCONTINUED | OUTPATIENT
Start: 2025-08-07 | End: 2025-08-07 | Stop reason: SURG

## 2025-08-07 RX ORDER — SOTALOL HYDROCHLORIDE 80 MG/1
40 TABLET ORAL EVERY 12 HOURS SCHEDULED
Status: DISCONTINUED | OUTPATIENT
Start: 2025-08-07 | End: 2025-08-08

## 2025-08-07 RX ORDER — HYDRALAZINE HYDROCHLORIDE 20 MG/ML
5 INJECTION INTRAMUSCULAR; INTRAVENOUS
Status: DISCONTINUED | OUTPATIENT
Start: 2025-08-07 | End: 2025-08-08

## 2025-08-07 RX ORDER — PROPOFOL 10 MG/ML
INJECTION, EMULSION INTRAVENOUS CONTINUOUS PRN
Status: DISCONTINUED | OUTPATIENT
Start: 2025-08-07 | End: 2025-08-07 | Stop reason: SURG

## 2025-08-07 RX ORDER — ONDANSETRON 2 MG/ML
4 INJECTION INTRAMUSCULAR; INTRAVENOUS EVERY 6 HOURS PRN
Status: DISCONTINUED | OUTPATIENT
Start: 2025-08-07 | End: 2025-08-08

## 2025-08-07 RX ORDER — NALOXONE HCL 0.4 MG/ML
0.4 VIAL (ML) INJECTION
Status: DISCONTINUED | OUTPATIENT
Start: 2025-08-07 | End: 2025-08-08 | Stop reason: HOSPADM

## 2025-08-07 RX ORDER — OXYCODONE HYDROCHLORIDE 5 MG/1
10 TABLET ORAL EVERY 4 HOURS PRN
Status: DISCONTINUED | OUTPATIENT
Start: 2025-08-07 | End: 2025-08-08 | Stop reason: HOSPADM

## 2025-08-07 RX ORDER — EPHEDRINE SULFATE 5 MG/ML
5 INJECTION INTRAVENOUS ONCE AS NEEDED
Status: DISCONTINUED | OUTPATIENT
Start: 2025-08-07 | End: 2025-08-08

## 2025-08-07 RX ORDER — ONDANSETRON 2 MG/ML
4 INJECTION INTRAMUSCULAR; INTRAVENOUS ONCE AS NEEDED
Status: DISCONTINUED | OUTPATIENT
Start: 2025-08-07 | End: 2025-08-08

## 2025-08-07 RX ORDER — NALOXONE HCL 0.4 MG/ML
0.4 VIAL (ML) INJECTION AS NEEDED
Status: DISCONTINUED | OUTPATIENT
Start: 2025-08-07 | End: 2025-08-08 | Stop reason: HOSPADM

## 2025-08-07 RX ORDER — NITROGLYCERIN 0.4 MG/1
0.4 TABLET SUBLINGUAL
Status: DISCONTINUED | OUTPATIENT
Start: 2025-08-07 | End: 2025-08-08 | Stop reason: HOSPADM

## 2025-08-07 RX ORDER — SODIUM CHLORIDE 0.9 % (FLUSH) 0.9 %
3-10 SYRINGE (ML) INJECTION AS NEEDED
Status: DISCONTINUED | OUTPATIENT
Start: 2025-08-07 | End: 2025-08-07 | Stop reason: HOSPADM

## 2025-08-07 RX ORDER — MORPHINE SULFATE 2 MG/ML
1 INJECTION, SOLUTION INTRAMUSCULAR; INTRAVENOUS EVERY 4 HOURS PRN
Status: DISCONTINUED | OUTPATIENT
Start: 2025-08-07 | End: 2025-08-08 | Stop reason: HOSPADM

## 2025-08-07 RX ORDER — ACETAMINOPHEN 325 MG/1
650 TABLET ORAL EVERY 8 HOURS
Status: DISCONTINUED | OUTPATIENT
Start: 2025-08-07 | End: 2025-08-08 | Stop reason: HOSPADM

## 2025-08-07 RX ORDER — DILTIAZEM HYDROCHLORIDE 120 MG/1
120 CAPSULE, COATED, EXTENDED RELEASE ORAL
Status: DISCONTINUED | OUTPATIENT
Start: 2025-08-07 | End: 2025-08-08 | Stop reason: HOSPADM

## 2025-08-07 RX ORDER — EPHEDRINE SULFATE 5 MG/ML
INJECTION INTRAVENOUS AS NEEDED
Status: DISCONTINUED | OUTPATIENT
Start: 2025-08-07 | End: 2025-08-07 | Stop reason: SURG

## 2025-08-07 RX ORDER — OXYCODONE HYDROCHLORIDE 5 MG/1
5 TABLET ORAL ONCE AS NEEDED
Status: DISCONTINUED | OUTPATIENT
Start: 2025-08-07 | End: 2025-08-08

## 2025-08-07 RX ORDER — ASPIRIN 81 MG/1
81 TABLET, CHEWABLE ORAL DAILY
Status: DISCONTINUED | OUTPATIENT
Start: 2025-08-10 | End: 2025-08-08 | Stop reason: HOSPADM

## 2025-08-07 RX ORDER — SODIUM CHLORIDE 9 MG/ML
40 INJECTION, SOLUTION INTRAVENOUS AS NEEDED
Status: DISCONTINUED | OUTPATIENT
Start: 2025-08-07 | End: 2025-08-07 | Stop reason: HOSPADM

## 2025-08-07 RX ADMIN — EPHEDRINE SULFATE 10 MG: 5 INJECTION INTRAVENOUS at 11:46

## 2025-08-07 RX ADMIN — PHENYLEPHRINE HYDROCHLORIDE 100 MCG: 10 INJECTION INTRAVENOUS at 13:08

## 2025-08-07 RX ADMIN — DILTIAZEM HYDROCHLORIDE 120 MG: 120 CAPSULE, EXTENDED RELEASE ORAL at 17:05

## 2025-08-07 RX ADMIN — PROPOFOL 100 MG: 10 INJECTION, EMULSION INTRAVENOUS at 11:36

## 2025-08-07 RX ADMIN — EPHEDRINE SULFATE 10 MG: 5 INJECTION INTRAVENOUS at 13:08

## 2025-08-07 RX ADMIN — FENTANYL CITRATE 50 MCG: 50 INJECTION, SOLUTION INTRAMUSCULAR; INTRAVENOUS at 11:49

## 2025-08-07 RX ADMIN — LISINOPRIL 10 MG: 5 TABLET ORAL at 17:04

## 2025-08-07 RX ADMIN — PROPOFOL 125 MCG/KG/MIN: 10 INJECTION, EMULSION INTRAVENOUS at 11:35

## 2025-08-07 RX ADMIN — LIDOCAINE HYDROCHLORIDE 60 MG: 20 INJECTION, SOLUTION EPIDURAL; INFILTRATION; INTRACAUDAL; PERINEURAL at 11:36

## 2025-08-07 RX ADMIN — CEFAZOLIN 2000 MG: 2 INJECTION, POWDER, FOR SOLUTION INTRAMUSCULAR; INTRAVENOUS at 11:41

## 2025-08-07 RX ADMIN — SODIUM CHLORIDE: 9 INJECTION, SOLUTION INTRAVENOUS at 11:31

## 2025-08-07 RX ADMIN — PHENYLEPHRINE HYDROCHLORIDE 0.5 MCG/KG/MIN: 10 INJECTION INTRAVENOUS at 11:49

## 2025-08-07 RX ADMIN — CEFAZOLIN 2000 MG: 2 INJECTION, POWDER, FOR SOLUTION INTRAMUSCULAR; INTRAVENOUS at 17:07

## 2025-08-08 VITALS
DIASTOLIC BLOOD PRESSURE: 73 MMHG | HEIGHT: 70 IN | SYSTOLIC BLOOD PRESSURE: 129 MMHG | WEIGHT: 220.24 LBS | OXYGEN SATURATION: 95 % | RESPIRATION RATE: 24 BRPM | BODY MASS INDEX: 31.53 KG/M2 | TEMPERATURE: 98 F | HEART RATE: 70 BPM

## 2025-08-08 LAB
ANION GAP SERPL CALCULATED.3IONS-SCNC: 12 MMOL/L (ref 5–15)
BUN SERPL-MCNC: 15 MG/DL (ref 8–23)
BUN/CREAT SERPL: 13.4 (ref 7–25)
CALCIUM SPEC-SCNC: 9.1 MG/DL (ref 8.6–10.5)
CHLORIDE SERPL-SCNC: 104 MMOL/L (ref 98–107)
CO2 SERPL-SCNC: 23 MMOL/L (ref 22–29)
CREAT SERPL-MCNC: 1.12 MG/DL (ref 0.76–1.27)
EGFRCR SERPLBLD CKD-EPI 2021: 64.4 ML/MIN/1.73
GLUCOSE SERPL-MCNC: 118 MG/DL (ref 65–99)
MAGNESIUM SERPL-MCNC: 2.2 MG/DL (ref 1.6–2.4)
MC_CV_MDC_IDC_RATE_1: 160
MC_CV_MDC_IDC_ZONE_ID: 1
MDC_IDC_MSMT_BATTERY_REMAINING_LONGEVITY: 132 MO
MDC_IDC_MSMT_BATTERY_REMAINING_PERCENTAGE: 100 %
MDC_IDC_MSMT_BATTERY_STATUS: NORMAL
MDC_IDC_MSMT_LEADCHNL_RA_DTM: NORMAL
MDC_IDC_MSMT_LEADCHNL_RA_IMPEDANCE_VALUE: 605
MDC_IDC_MSMT_LEADCHNL_RA_PACING_THRESHOLD_AMPLITUDE: 0.4
MDC_IDC_MSMT_LEADCHNL_RA_PACING_THRESHOLD_POLARITY: NORMAL
MDC_IDC_MSMT_LEADCHNL_RA_PACING_THRESHOLD_PULSEWIDTH: 0.4
MDC_IDC_MSMT_LEADCHNL_RA_SENSING_INTR_AMPL: 5.8
MDC_IDC_MSMT_LEADCHNL_RV_DTM: NORMAL
MDC_IDC_MSMT_LEADCHNL_RV_IMPEDANCE_VALUE: 785
MDC_IDC_MSMT_LEADCHNL_RV_PACING_THRESHOLD_AMPLITUDE: 0.5
MDC_IDC_MSMT_LEADCHNL_RV_PACING_THRESHOLD_POLARITY: NORMAL
MDC_IDC_MSMT_LEADCHNL_RV_PACING_THRESHOLD_PULSEWIDTH: 0.4
MDC_IDC_PG_IMPLANT_DTM: NORMAL
MDC_IDC_PG_MFG: NORMAL
MDC_IDC_PG_MODEL: NORMAL
MDC_IDC_PG_SERIAL: NORMAL
MDC_IDC_PG_TYPE: NORMAL
MDC_IDC_SESS_DTM: NORMAL
MDC_IDC_SESS_TYPE: NORMAL
MDC_IDC_SET_BRADY_AT_MODE_SWITCH_RATE: 170
MDC_IDC_SET_BRADY_LOWRATE: 70
MDC_IDC_SET_BRADY_MAX_SENSOR_RATE: 130
MDC_IDC_SET_BRADY_MAX_TRACKING_RATE: 130
MDC_IDC_SET_BRADY_MODE: NORMAL
MDC_IDC_SET_BRADY_PAV_DELAY: 150
MDC_IDC_SET_BRADY_SAV_DELAY: 150
MDC_IDC_SET_LEADCHNL_RA_PACING_AMPLITUDE: 3.5
MDC_IDC_SET_LEADCHNL_RA_PACING_POLARITY: NORMAL
MDC_IDC_SET_LEADCHNL_RA_PACING_PULSEWIDTH: 0.4
MDC_IDC_SET_LEADCHNL_RA_SENSING_POLARITY: NORMAL
MDC_IDC_SET_LEADCHNL_RA_SENSING_SENSITIVITY: 1
MDC_IDC_SET_LEADCHNL_RV_PACING_AMPLITUDE: 3.5
MDC_IDC_SET_LEADCHNL_RV_PACING_POLARITY: NORMAL
MDC_IDC_SET_LEADCHNL_RV_PACING_PULSEWIDTH: 0.4
MDC_IDC_SET_LEADCHNL_RV_SENSING_POLARITY: NORMAL
MDC_IDC_SET_LEADCHNL_RV_SENSING_SENSITIVITY: 2
MDC_IDC_SET_ZONE_STATUS: NORMAL
MDC_IDC_SET_ZONE_TYPE: NORMAL
MDC_IDC_STAT_BRADY_RA_PERCENT_PACED: 55
MDC_IDC_STAT_BRADY_RV_PERCENT_PACED: 88
POTASSIUM SERPL-SCNC: 4.4 MMOL/L (ref 3.5–5.2)
SODIUM SERPL-SCNC: 139 MMOL/L (ref 136–145)

## 2025-08-08 PROCEDURE — 63710000001 SOTALOL 80 MG TABLET: Performed by: INTERNAL MEDICINE

## 2025-08-08 PROCEDURE — 63710000001 DILTIAZEM CD 120 MG CAPSULE SUSTAINED-RELEASE 24 HR: Performed by: INTERNAL MEDICINE

## 2025-08-08 PROCEDURE — 80048 BASIC METABOLIC PNL TOTAL CA: CPT | Performed by: INTERNAL MEDICINE

## 2025-08-08 PROCEDURE — 25010000002 CEFAZOLIN PER 500 MG: Performed by: INTERNAL MEDICINE

## 2025-08-08 PROCEDURE — 83735 ASSAY OF MAGNESIUM: CPT | Performed by: INTERNAL MEDICINE

## 2025-08-08 PROCEDURE — A9270 NON-COVERED ITEM OR SERVICE: HCPCS | Performed by: INTERNAL MEDICINE

## 2025-08-08 PROCEDURE — 93010 ELECTROCARDIOGRAM REPORT: CPT | Performed by: INTERNAL MEDICINE

## 2025-08-08 PROCEDURE — 63710000001 LISINOPRIL 5 MG TABLET: Performed by: INTERNAL MEDICINE

## 2025-08-08 PROCEDURE — 93005 ELECTROCARDIOGRAM TRACING: CPT | Performed by: INTERNAL MEDICINE

## 2025-08-08 RX ORDER — SOTALOL HYDROCHLORIDE 80 MG/1
40 TABLET ORAL 2 TIMES DAILY
Qty: 120 TABLET | Refills: 1 | Status: SHIPPED | OUTPATIENT
Start: 2025-08-08

## 2025-08-08 RX ORDER — SOTALOL HYDROCHLORIDE 80 MG/1
40 TABLET ORAL EVERY 12 HOURS SCHEDULED
Status: DISCONTINUED | OUTPATIENT
Start: 2025-08-08 | End: 2025-08-08 | Stop reason: HOSPADM

## 2025-08-08 RX ORDER — HYDROCODONE BITARTRATE AND ACETAMINOPHEN 5; 325 MG/1; MG/1
1 TABLET ORAL EVERY 6 HOURS PRN
Qty: 16 TABLET | Refills: 0 | Status: SHIPPED | OUTPATIENT
Start: 2025-08-08 | End: 2025-08-12

## 2025-08-08 RX ORDER — CEPHALEXIN 500 MG/1
500 CAPSULE ORAL 2 TIMES DAILY
Qty: 10 CAPSULE | Refills: 0 | Status: SHIPPED | OUTPATIENT
Start: 2025-08-08 | End: 2025-08-13

## 2025-08-08 RX ORDER — DILTIAZEM HYDROCHLORIDE 120 MG/1
120 CAPSULE, COATED, EXTENDED RELEASE ORAL
Qty: 90 CAPSULE | Refills: 0 | Status: SHIPPED | OUTPATIENT
Start: 2025-08-09

## 2025-08-08 RX ORDER — LISINOPRIL 10 MG/1
10 TABLET ORAL DAILY
Qty: 90 TABLET | Refills: 0 | Status: SHIPPED | OUTPATIENT
Start: 2025-08-08

## 2025-08-08 RX ADMIN — CEFAZOLIN 2000 MG: 2 INJECTION, POWDER, FOR SOLUTION INTRAMUSCULAR; INTRAVENOUS at 01:59

## 2025-08-08 RX ADMIN — SOTALOL HYDROCHLORIDE 40 MG: 80 TABLET ORAL at 09:04

## 2025-08-08 RX ADMIN — DILTIAZEM HYDROCHLORIDE 120 MG: 120 CAPSULE, EXTENDED RELEASE ORAL at 09:04

## 2025-08-08 RX ADMIN — LISINOPRIL 10 MG: 5 TABLET ORAL at 09:04

## 2025-08-09 LAB
QT INTERVAL: 425 MS
QT INTERVAL: 438 MS
QT INTERVAL: 441 MS
QTC INTERVAL: 460 MS
QTC INTERVAL: 492 MS
QTC INTERVAL: 496 MS

## 2025-08-12 ENCOUNTER — TELEPHONE (OUTPATIENT)
Dept: CARDIOLOGY | Facility: CLINIC | Age: 85
End: 2025-08-12
Payer: MEDICARE

## 2025-08-15 ENCOUNTER — OFFICE VISIT (OUTPATIENT)
Dept: CARDIOLOGY | Facility: CLINIC | Age: 85
End: 2025-08-15
Payer: MEDICARE

## 2025-08-15 DIAGNOSIS — Z95.0 PRESENCE OF CARDIAC PACEMAKER: ICD-10-CM

## 2025-08-15 DIAGNOSIS — I49.5 SICK SINUS SYNDROME: Primary | ICD-10-CM

## 2025-08-29 ENCOUNTER — OFFICE VISIT (OUTPATIENT)
Dept: FAMILY MEDICINE CLINIC | Facility: CLINIC | Age: 85
End: 2025-08-29
Payer: MEDICARE

## 2025-08-29 VITALS
RESPIRATION RATE: 20 BRPM | OXYGEN SATURATION: 99 % | BODY MASS INDEX: 32.14 KG/M2 | HEART RATE: 71 BPM | DIASTOLIC BLOOD PRESSURE: 80 MMHG | WEIGHT: 217 LBS | SYSTOLIC BLOOD PRESSURE: 126 MMHG | HEIGHT: 69 IN

## 2025-08-29 DIAGNOSIS — Z00.00 MEDICARE ANNUAL WELLNESS VISIT, SUBSEQUENT: Primary | ICD-10-CM

## 2025-08-29 DIAGNOSIS — H61.23 BILATERAL HEARING LOSS DUE TO CERUMEN IMPACTION: ICD-10-CM

## 2025-08-29 DIAGNOSIS — I25.10 CORONARY ARTERY DISEASE INVOLVING NATIVE CORONARY ARTERY OF NATIVE HEART WITHOUT ANGINA PECTORIS: ICD-10-CM

## 2025-08-29 DIAGNOSIS — I10 PRIMARY HYPERTENSION: ICD-10-CM

## 2025-08-29 DIAGNOSIS — E78.5 HYPERLIPIDEMIA, UNSPECIFIED HYPERLIPIDEMIA TYPE: ICD-10-CM

## (undated) DEVICE — Device: Brand: NRG TRANSSEPTAL NEEDLE

## (undated) DEVICE — PAD E/S GRND SGL/FOIL 9FT/CORD DISP

## (undated) DEVICE — PINNACLE INTRODUCER SHEATH: Brand: PINNACLE

## (undated) DEVICE — UNDYED BRAIDED (POLYGLACTIN 910), SYNTHETIC ABSORBABLE SUTURE: Brand: COATED VICRYL

## (undated) DEVICE — 3M™ PATIENT PLATE, CORDED, SPLIT, LARGE, 40 PER CASE, 1179: Brand: 3M™

## (undated) DEVICE — GLV SURG BIOGEL LTX PF 8 1/2

## (undated) DEVICE — PROVE COVER: Brand: UNBRANDED

## (undated) DEVICE — Device: Brand: CARTO 3

## (undated) DEVICE — PREF.GUIDING SHEATH W/MULT.CRV: Brand: PREFACE

## (undated) DEVICE — CABL BIPOL W/ALLGTR CLIP/SM 12FT

## (undated) DEVICE — Device: Brand: THERMOCOOL SMARTTOUCH SF

## (undated) DEVICE — GLV SURG DERMASSURE GRN LF PF 8.5

## (undated) DEVICE — SUT VIC 2/0 CT1 36IN

## (undated) DEVICE — GOWN,REINFRCE,POLY,SIRUS,BREATH SLV,XXLG: Brand: MEDLINE

## (undated) DEVICE — SOL NACL 0.9PCT 1000ML

## (undated) DEVICE — ST ACC MICROPUNCTURE STFF/CANN PLAT/TP 4F 21G 40CM

## (undated) DEVICE — OCTA,GALAXY,3-3-3-3-3,F-CURVE: Brand: OCTARAY MAPPING CATHETER

## (undated) DEVICE — SLV SCD CALF HEMOFORCE DVT THERP REPROC MD

## (undated) DEVICE — ELECTRD DEFIB M/FUNC PROPADZ RADIOL 2PK

## (undated) DEVICE — CATH IV INSYTE AUTOGARD SHLD 20G 1.16IN

## (undated) DEVICE — VASCULAR CDS: Brand: MEDLINE INDUSTRIES, INC.

## (undated) DEVICE — Device: Brand: WEBSTER CS

## (undated) DEVICE — ADHS SKIN PREMIERPRO EXOFIN TOPICAL HI/VISC .5ML

## (undated) DEVICE — TUBING, SUCTION, 1/4" X 12', STRAIGHT: Brand: MEDLINE

## (undated) DEVICE — BLOOD TRANSFUSION FILTER: Brand: HAEMONETICS

## (undated) DEVICE — SUT PROLN 6/0 BV1 D/A 30IN 8709H

## (undated) DEVICE — INTRO SHEATH PRELUDE SNAP .038 6F 13CM W/SDPRT

## (undated) DEVICE — SUT ETHIB 0/0 MO6 I8IN CX45D

## (undated) DEVICE — GEL ULTRASND AQUASONIC 100 20GM STRL

## (undated) DEVICE — UNIVERSAL CUTTER: Brand: ACUITY™

## (undated) DEVICE — PK ATS CUST W CARDIOTOMY RESEVOIR

## (undated) DEVICE — IMMOB SHLDR CUT/AWAY UNIV

## (undated) DEVICE — VI-DRAPE ISOLATION BAG: Brand: CONVERTORS

## (undated) DEVICE — Device: Brand: REFERENCE PATCH CARTO 3

## (undated) DEVICE — Device: Brand: SOUNDSTAR

## (undated) DEVICE — INTRO SHEATH PRELUDE SNAP .038 9F 13CM W/SDPRT BLK

## (undated) DEVICE — REFLEX ONE, SKIN STAPLER, 35 WIDE: Brand: REFLEX

## (undated) DEVICE — SHT AIR TRANSFR COMFRT GLIDE LAT 40X80IN

## (undated) DEVICE — SUT SILK 4/0 TIES 18IN A183H

## (undated) DEVICE — SUT PROLN 5/0 C1 D/A 36IN 8720H

## (undated) DEVICE — MICRO TIP WIPE: Brand: DEVON

## (undated) DEVICE — KT SURG TURNOVER 050

## (undated) DEVICE — DRAPE,INSTRUMENT,MAGNETIC,10X16: Brand: MEDLINE

## (undated) DEVICE — Device: Brand: SMARTABLATE

## (undated) DEVICE — 3M™ IOBAN™ 2 ANTIMICROBIAL INCISE DRAPE 6650EZ: Brand: IOBAN™ 2

## (undated) DEVICE — BLANKT WARM UNDER/BDY FUL/ACC A/ 90X206CM

## (undated) DEVICE — APPL CHLORAPREP HI/LITE TINTED 10.5ML ORNG

## (undated) DEVICE — KT FLTR GAS LN OXYGENATOR 1/4IN STRL

## (undated) DEVICE — SUT PROLN 5/0 RB1 D/A 36IN 8556H

## (undated) DEVICE — PACEMAKER CDS: Brand: MEDLINE INDUSTRIES, INC.

## (undated) DEVICE — IRRIGATOR BULB ASEPTO 60CC STRL

## (undated) DEVICE — PK TRY HEART CATH 50

## (undated) DEVICE — SUT MNCRYL 2/0 CT1 36IN

## (undated) DEVICE — SUT VIC 3/0 SH 27IN J416H

## (undated) DEVICE — Device

## (undated) DEVICE — PENCL HND ROCKRSWTCH HOLSTR EZ CLEAN TP CRD 10FT

## (undated) DEVICE — INTERFACE CABLE: Brand: CARTO 3 SYSTEM ECO INTERFACE CABLE

## (undated) DEVICE — SYS PERFUS SEP PLATLT W TIPS CUST

## (undated) DEVICE — SUT SILK 3/0 SH CR8 18IN C013D

## (undated) DEVICE — RADIFOCUS GLIDEWIRE: Brand: GLIDEWIRE

## (undated) DEVICE — Device: Brand: RFP-100A CONNECTOR CABLE

## (undated) DEVICE — SUT MNCRYL 3/0 SH 27 IN Y416H

## (undated) DEVICE — PENCL SMOKE/EVAC MEGADYNE TELESCP 10FT